# Patient Record
Sex: MALE | Race: WHITE
[De-identification: names, ages, dates, MRNs, and addresses within clinical notes are randomized per-mention and may not be internally consistent; named-entity substitution may affect disease eponyms.]

---

## 2017-03-03 ENCOUNTER — HOSPITAL ENCOUNTER (EMERGENCY)
Dept: HOSPITAL 58 - ED | Age: 32
Discharge: HOME | End: 2017-03-03

## 2017-03-03 VITALS — BODY MASS INDEX: 43 KG/M2

## 2017-03-03 VITALS — DIASTOLIC BLOOD PRESSURE: 89 MMHG | TEMPERATURE: 98.6 F | SYSTOLIC BLOOD PRESSURE: 148 MMHG

## 2017-03-03 DIAGNOSIS — M54.2: Primary | ICD-10-CM

## 2017-03-03 PROCEDURE — 96372 THER/PROPH/DIAG INJ SC/IM: CPT

## 2017-03-03 PROCEDURE — 99282 EMERGENCY DEPT VISIT SF MDM: CPT

## 2017-03-03 NOTE — CT
EXAM:  CT of the cervical spine without contrast 

  

History:  Neck pain. 

  

Technique:  Multiplanar CT images through the cervical spine were obtained without the administratio
n of IV contrast 

  

Findings:  The visualized lung apices are free of consolidation.  The visualized airway remains patel
nt. 

  

Reversal of the normal cervical lordosis.  No acute fracture or subluxation.  Disc space heights are
 preserved.  No prevertebral soft tissue swelling.  Predental space is not widened.  Bony spinal can
al is not compromised.  No significant bony neural foraminal narrowing. 

  

Impression:  No acute osseous abnormality of the cervical spine and no significant degenerative smith
ges.  Reversal of the normal cervical lordosis.

## 2017-03-03 NOTE — ED.PDOC
General


ED Provider: 


Dr. IRVIN PURI





Chief Complaint: Neck Pain Non-Injury


Stated Complaint: NECK PAIN


Time Seen by Physician: 13:00


Information Source: Patient


Primary Care Provider: 


BENTON MARCIALYAZ





Nursing and Triage Documentation Reviewed and Agree: Yes





Musculoskeletal Complaint Exam





- Neck Pain Complaint/Exam


Mechanism of Injury: Reports: No known trauma


Onset/Duration: 2 DAYS


Symptoms Are: Still present


Timing: Constant


Episodes Lasting: Hours


Initial Severity: Moderate


Current Severity: Moderate


Character: Reports: Aching, Throbbing, Spasmodic, Stiffness


Aggravating: Reports: Position, Movement


Alleviating: Reports: Position


Associated Signs and Symptoms: Denies: Swelling, Redness, Bruising, Fever, 

Nuchal rigidity, Weakness, Headache, Paresthesia


Related History: Reports: Similar episode


Meningitis Risk Factors: Reports: None


Cervical Spine Injury Risk Factors: Reports: Post-Midline CS tender


Related Surgical History: Reports: None


Carotid Bruit Present: No


Pain on Passive Flexion: No


Positive Kernig's Sign: No


Tenderness: Present: Paraspinal


Focal Weakness: Present: None


Focal Sensory Loss: Reports: None


Nexus Low Risk Criteria: No evidence of intoxicat., No Altered LOC, No focal 

neuro deficit, No distracting injuries


Differential Diagnoses: Arthritis, Sprain, Strain





Review of Systems





- Review Of Systems


Constitutional: Reports: No symptoms


Eyes: Reports: No symptoms


Ears, Nose, Mouth, Throat: Reports: No symptoms


Respiratory: Reports: No symptoms


Cardiac: Reports: No symptoms


GI: Reports: No symptoms


: Reports: No symptoms


Musculoskeletal: Reports: Neck pain


Skin: Reports: No symptoms


Neurological: Reports: No symptoms


Endocrine: Reports: No symptoms


Hematologic/Lymphatic: Reports: No symptoms


All Other Systems: Reviewed and Negative





Past Medical History





- Past Medical History


Previously Healthy: Yes


Endocrine: Reports: None


Cardiovascular: Reports: Hypertension


Respiratory: Reports: None


Hematological: Reports: None


Gastrointestinal: Reports: None


Genitourinary: Reports: None


Neuro/Psych: Reports: Depression, Bipolar Disorder


Musculoskeletal: Reports: None


Cancer: Reports: None





- Surgical History


General Surgical History: Reports: Orthopedic





- Family History


Family History: Reports: Unknown





- Social History


Smoking Status: Former smoker


Hx Substance Use: No


Alcohol Screening: Occasionally





Physical Exam





- Physical Exam


Appearance: Well-appearing, No pain distress, Well-nourished


Eyes: CESAR, EOMI, Conjunctiva clear


ENT: Ears normal, Nose normal, Oropharynx normal


Respiratory: Airway patent, Breath sounds clear, Breath sounds equal, 

Respirations nonlabored


Cardiovascular: RRR, Pulses normal, No rub, No murmur


GI/: Soft, Nontender, No masses, Bowel sounds normal, No Organomegaly


Musculoskeletal: Normal strength, ROM intact, No edema, No calf tenderness


Skin: Warm, Dry, Normal color


Neurological: Sensation intact, Motor intact, Reflexes intact, Cranial nerves 

intact, Alert, Oriented


Psychiatric: Affect appropriate, Mood appropriate





Interpretation





- Radiology Interpretation


Radiology Interpretation By: Radiologist





Critical Care Note





- Critical Care Note


Total Time (mins): 0





Course





- Course


Orders, Labs, Meds: 





Orders











 Category Date Time Status


 


 Morphine Sulfate [Morphine 4 mg/ml Syringe] MEDS  03/03/17 13:34 Stat





 4 mg IM ONCE STA   


 


 Ondansetron HCl/Pf [Zofran 4 mg/2 ml] MEDS  03/03/17 13:34 Stat





 4 mg IM ONCE STA   


 


 CT CERVICAL SPINE W/O CONTRAST Stat RADS  03/03/17 13:08 Taken








Medications














Discontinued Medications














Generic Name Dose Route Start Last Admin





  Trade Name Marissa  PRN Reason Stop Dose Admin


 


Morphine Sulfate  4 mg  03/03/17 13:34  





  Morphine 4 Mg/Ml Syringe  IM  03/03/17 13:35  





  ONCE STA   


 


Ondansetron HCl  4 mg  03/03/17 13:34  





  Zofran 4 Mg/2 Ml  IM  03/03/17 13:35  





  ONCE STA   











Vital Signs: 





 











  Temp Pulse Resp BP Pulse Ox


 


 03/03/17 12:51  98.6 F  71  20  148/89 H  95














Departure





- Departure


Time of Disposition: 14:30


Disposition: HOME SELF-CARE


Discharge Problem: 


 Neck pain





Instructions:  Cervical Sprain (ED), Acute Neck Pain (ED), Neck Pain (ED)


Condition: Good


Pt referred to PMD for follow-up: No


Additional Instructions: 


Please call your Family Physician as soon as possible to schedule a follow-up 

appointment.


Prescriptions: 


Oxycodone-Acetaminophen  [Percocet ] 1 tab PO Q8H #20 tablet


Allergies/Adverse Reactions: 


Allergies





No Known Allergies Allergy (Verified 03/03/17 13:01)


 








Home Medications: 


Ambulatory Orders





Oxycodone-Acetaminophen  [Percocet ] 1 tab PO Q8H #20 tablet 03/03/ 17 








Disposition Discussed With: Patient

## 2017-05-28 ENCOUNTER — HOSPITAL ENCOUNTER (EMERGENCY)
Dept: HOSPITAL 58 - ED | Age: 32
Discharge: HOME | End: 2017-05-28
Payer: COMMERCIAL

## 2017-05-28 VITALS — BODY MASS INDEX: 38.3 KG/M2

## 2017-05-28 VITALS — TEMPERATURE: 96.6 F | DIASTOLIC BLOOD PRESSURE: 95 MMHG | SYSTOLIC BLOOD PRESSURE: 149 MMHG

## 2017-05-28 DIAGNOSIS — S19.9XXA: Primary | ICD-10-CM

## 2017-05-28 DIAGNOSIS — M54.2: ICD-10-CM

## 2017-05-28 DIAGNOSIS — I10: ICD-10-CM

## 2017-05-28 PROCEDURE — 96372 THER/PROPH/DIAG INJ SC/IM: CPT

## 2017-05-28 PROCEDURE — 99283 EMERGENCY DEPT VISIT LOW MDM: CPT

## 2017-05-28 NOTE — ED.PDOC
General


ED Provider: 


Dr. IRVIN PURI





Chief Complaint: Neck Injury


Stated Complaint: neck pain


Time Seen by Physician: 16:43


Mode of Arrival: Walk-In


Information Source: Patient


Exam Limitations: No limitations


[facility] 


[facility]





Nursing and Triage Documentation Reviewed and Agree: Yes





Musculoskeletal Complaint Exam





- Neck Pain Complaint/Exam


Mechanism of Injury: Reports: No known trauma


Onset/Duration: 1 hr ago


Symptoms Are: Still present


Timing: Constant


Initial Severity: Moderate


Current Severity: Moderate


Location: Reports: Discrete


Character: Reports: Aching


Aggravating: Reports: None


Alleviating: Reports: None


Associated Signs and Symptoms: Denies: Swelling, Redness, Bruising, Fever, 

Nuchal rigidity, Weakness, Headache, Paresthesia


Related History: Reports: Similar episode


Meningitis Risk Factors: Reports: None


Cervical Spine Injury Risk Factors: Reports: None


Related Surgical History: Reports: None


Carotid Bruit Present: No


Pain on Passive Flexion: No


Positive Kernig's Sign: No


Tenderness: Present: Midline


Focal Weakness: Present: None


Focal Sensory Loss: Reports: None


Nexus Low Risk Criteria: No evidence of intoxicat., No Altered LOC, No focal 

neuro deficit, No distracting injuries


Differential Diagnoses: Cervical Fracture, Sprain, Strain





Review of Systems





- Review Of Systems


Constitutional: Reports: No symptoms


Eyes: Reports: No symptoms


Ears, Nose, Mouth, Throat: Reports: No symptoms


Respiratory: Reports: No symptoms


Cardiac: Reports: No symptoms


GI: Reports: No symptoms


: Reports: No symptoms


Musculoskeletal: Reports: Neck pain


Skin: Reports: No symptoms


Neurological: Reports: No symptoms


Endocrine: Reports: No symptoms


Hematologic/Lymphatic: Reports: No symptoms


All Other Systems: Reviewed and Negative





Past Medical History





- Past Medical History


Previously Healthy: Yes


Endocrine: Reports: None


Cardiovascular: Reports: Hypertension


Respiratory: Reports: None


Hematological: Reports: None


Gastrointestinal: Reports: None


Genitourinary: Reports: None


Neuro/Psych: Reports: Depression, Bipolar Disorder


Musculoskeletal: Reports: None


Cancer: Reports: None





- Surgical History


General Surgical History: Reports: Orthopedic





- Family History


Family History: Reports: Unknown





- Social History


Smoking Status: Former smoker


Hx Substance Use: No


Alcohol Screening: Occasionally





- Immunizations


Tetanus Shot up to Date: Yes





Physical Exam





- Physical Exam


Appearance: Well-appearing, No pain distress, Well-nourished


Eyes: CESAR, EOMI, Conjunctiva clear


ENT: Ears normal, Nose normal, Oropharynx normal


Respiratory: Airway patent, Breath sounds clear, Breath sounds equal, 

Respirations nonlabored


Cardiovascular: RRR, Pulses normal, No rub, No murmur


GI/: Soft, Nontender, No masses, Bowel sounds normal, No Organomegaly


Musculoskeletal: Normal strength, ROM intact, No edema, No calf tenderness


Skin: Warm, Dry, Normal color


Neurological: Sensation intact, Motor intact, Reflexes intact, Cranial nerves 

intact, Alert, Oriented


Psychiatric: Affect appropriate, Mood appropriate





Critical Care Note





- Critical Care Note


Total Time (mins): 0





Course





- Course


Orders, Labs, Meds: 





Orders











 Category Date Time Status


 


 Diazepam Syringe [Valium Syringe] MEDS  05/28/17 16:51 Discontinued





 5 mg IM ONCE STA   


 


 Ketorolac Tromethamine [Toradol] MEDS  05/28/17 16:52 Discontinued





 30 mg IM ONCE STA   


 


 Morphine Sulfate [Morphine 4 mg/ml Syringe] MEDS  05/28/17 16:51 Discontinued





 4 mg IM ONCE STA   


 


 Ondansetron HCl/Pf [Zofran 4 mg/2 ml] MEDS  05/28/17 16:51 Discontinued





 4 mg IM ONCE STA   








Medications














Discontinued Medications














Generic Name Dose Route Start Last Admin





  Trade Name Freq  PRN Reason Stop Dose Admin


 


Diazepam  5 mg  05/28/17 16:51  05/28/17 17:06





  Valium Syringe  IM  05/28/17 16:52  5 mg





  ONCE STA   Administration


 


Ketorolac Tromethamine  30 mg  05/28/17 16:52  05/28/17 17:05





  Toradol  IM  05/28/17 16:53  30 mg





  ONCE STA   Administration


 


Morphine Sulfate  4 mg  05/28/17 16:51  05/28/17 17:03





  Morphine 4 Mg/Ml Syringe  IM  05/28/17 16:52  4 mg





  ONCE STA   Administration


 


Ondansetron HCl  4 mg  05/28/17 16:51  05/28/17 17:02





  Zofran 4 Mg/2 Ml  IM  05/28/17 16:52  4 mg





  ONCE STA   Administration











Vital Signs: 





 











  Temp Pulse Resp BP Pulse Ox


 


 05/28/17 16:39  96.6 F L  81  20  149/95 H  95














Departure





- Departure


Time of Disposition: 17:40


Disposition: HOME SELF-CARE


Discharge Problem: 


 Neck pain, acute





Instructions:  Acute Neck Pain (ED), Neck Pain (ED), Cervical Sprain (ED), Soft 

Cervical Collar (ED), Cervical Strain (ED)


Condition: Good


Pt referred to PMD for follow-up: No


Additional Instructions: 


Please call your Family Physician as soon as possible to schedule a follow-up 

appointment.


Allergies/Adverse Reactions: 


Allergies





No Known Allergies Allergy (Verified 05/28/17 16:48)


 








Home Medications: 


Ambulatory Orders





1 [No Reported Medications]  05/28/17

## 2017-06-01 ENCOUNTER — HOSPITAL ENCOUNTER (OUTPATIENT)
Dept: HOSPITAL 58 - LAB | Age: 32
Discharge: HOME | End: 2017-06-01
Attending: PHYSICIAN ASSISTANT

## 2017-06-01 ENCOUNTER — HOSPITAL ENCOUNTER (EMERGENCY)
Dept: HOSPITAL 58 - ED | Age: 32
Discharge: HOME | End: 2017-06-01

## 2017-06-01 VITALS — BODY MASS INDEX: 38.3 KG/M2

## 2017-06-01 VITALS — DIASTOLIC BLOOD PRESSURE: 88 MMHG | TEMPERATURE: 96 F | SYSTOLIC BLOOD PRESSURE: 151 MMHG

## 2017-06-01 DIAGNOSIS — F17.220: ICD-10-CM

## 2017-06-01 DIAGNOSIS — S16.1XXA: Primary | ICD-10-CM

## 2017-06-01 DIAGNOSIS — M54.2: Primary | ICD-10-CM
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
## 2017-06-01 LAB
ALBUMIN SERPL-MCNC: 4 G/DL (ref 3.4–5)
ALBUMIN/GLOB SERPL: 1.18 {RATIO}
ALP SERPL-CCNC: 52 U/L (ref 50–136)
ALT SERPL-CCNC: 110 U/L (ref 12–78)
ANION GAP SERPL CALC-SCNC: 12.1 MMOL/L
AST SERPL-CCNC: 325 U/L (ref 15–37)
BILIRUB SERPL-MCNC: 0.34 MG/DL (ref 0–1.2)
BUN SERPL-MCNC: 12 MG/DL (ref 7–18)
BUN/CREAT SERPL: 13.18
CALCIUM SERPL-MCNC: 9.5 MG/DL (ref 8.2–10.2)
CHLORIDE SERPL-SCNC: 101 MMOL/L (ref 98–107)
CO2 BLD-SCNC: 29 MMOL/L (ref 21–32)
CREAT SERPL-MCNC: 0.91 MG/DL (ref 0.6–1.1)
GFR SERPLBLD BASED ON 1.73 SQ M-ARVRAT: 97 ML/MIN
GLOBULIN SER CALC-MCNC: 3.4 G/L
GLUCOSE SERPL-MCNC: 91 MG/DL (ref 70–100)
POTASSIUM SERPL-SCNC: 4.1 MMOL/L (ref 3.5–5.1)
PROT SERPL-MCNC: 7.4 G/DL (ref 6.4–8.2)
SODIUM SERPL-SCNC: 138 MMOL/L (ref 136–145)

## 2017-06-01 PROCEDURE — 36415 COLL VENOUS BLD VENIPUNCTURE: CPT

## 2017-06-01 PROCEDURE — 96372 THER/PROPH/DIAG INJ SC/IM: CPT

## 2017-06-01 PROCEDURE — 80053 COMPREHEN METABOLIC PANEL: CPT

## 2017-06-01 PROCEDURE — 96374 THER/PROPH/DIAG INJ IV PUSH: CPT

## 2017-06-01 PROCEDURE — 96375 TX/PRO/DX INJ NEW DRUG ADDON: CPT

## 2017-06-01 PROCEDURE — 99282 EMERGENCY DEPT VISIT SF MDM: CPT

## 2017-06-01 RX ADMIN — ONDANSETRON STA MG: 2 INJECTION INTRAMUSCULAR; INTRAVENOUS at 08:18

## 2017-06-01 RX ADMIN — KETOROLAC TROMETHAMINE STA MG: 30 INJECTION, SOLUTION INTRAMUSCULAR; INTRAVENOUS at 08:17

## 2017-06-01 RX ADMIN — MORPHINE SULFATE STA MG: 4 INJECTION, SOLUTION INTRAMUSCULAR; INTRAVENOUS at 08:18

## 2017-06-01 NOTE — ED.PDOC
General


ED Provider: 


Dr. IRVIN PURI





Chief Complaint: Neck Pain Non-Injury


Stated Complaint: neck pain


Time Seen by Physician: 07:43


Mode of Arrival: Walk-In


Information Source: Patient


Exam Limitations: No limitations


[facility] 


[facility]





Nursing and Triage Documentation Reviewed and Agree: Yes





Musculoskeletal Complaint Exam





- Neck Pain Complaint/Exam


Mechanism of Injury: Reports: Trauma, No known trauma (pulled  )


Symptoms Are: Still present


Timing: Intermittent


Episodes Lasting: Weeks


Initial Severity: Moderate


Current Severity: Moderate


Location: Reports: Discrete


Character: Reports: Dull, Aching


Aggravating: Reports: None


Alleviating: Reports: None


Associated Signs and Symptoms: Denies: Swelling, Redness, Bruising, Fever, 

Nuchal rigidity, Weakness, Headache, Paresthesia


Related History: Reports: Similar episode


Meningitis Risk Factors: Reports: None


Cervical Spine Injury Risk Factors: Reports: None


Related Surgical History: Reports: None


Carotid Bruit Present: No


Pain on Passive Flexion: No


Positive Kernig's Sign: No


Focal Weakness: Present: None


Focal Sensory Loss: Reports: None


Nexus Low Risk Criteria: No post-midline CS tender, No evidence of intoxicat., 

No Altered LOC, No focal neuro deficit, No distracting injuries


Differential Diagnoses: Sprain, Strain





Review of Systems





- Review Of Systems


Constitutional: Reports: No symptoms


Eyes: Reports: No symptoms


Ears, Nose, Mouth, Throat: Reports: No symptoms


Respiratory: Reports: No symptoms


Cardiac: Reports: No symptoms


GI: Reports: No symptoms


: Reports: No symptoms


Musculoskeletal: Reports: Back pain


Skin: Reports: No symptoms


Neurological: Reports: No symptoms


Endocrine: Reports: No symptoms


Hematologic/Lymphatic: Reports: No symptoms


All Other Systems: Reviewed and Negative





Past Medical History





- Past Medical History


Previously Healthy: Yes


Endocrine: Reports: None


Cardiovascular: Reports: Hypertension


Respiratory: Reports: None


Hematological: Reports: None


Gastrointestinal: Reports: None


Genitourinary: Reports: None


Neuro/Psych: Reports: Depression, Bipolar Disorder


Musculoskeletal: Reports: None


Cancer: Reports: None





- Surgical History


General Surgical History: Reports: Orthopedic





- Family History


Family History: Reports: Unknown





- Social History


Smoking Status: Chews tobacco


Hx Substance Use: No


Alcohol Screening: Occasionally





- Immunizations


Tetanus Shot up to Date: Yes





Physical Exam





- Physical Exam


Appearance: Well-appearing, No pain distress, Well-nourished


Eyes: CESAR, EOMI, Conjunctiva clear


ENT: Ears normal, Nose normal, Oropharynx normal


Respiratory: Airway patent, Breath sounds clear, Breath sounds equal, 

Respirations nonlabored


Cardiovascular: RRR, Pulses normal, No rub, No murmur


GI/: Soft, Nontender, No masses, Bowel sounds normal, No Organomegaly


Musculoskeletal: Normal strength, ROM intact, No edema, No calf tenderness


Skin: Warm, Dry, Normal color


Neurological: Sensation intact, Motor intact, Reflexes intact, Cranial nerves 

intact, Alert, Oriented


Psychiatric: Affect appropriate, Mood appropriate





Critical Care Note





- Critical Care Note


Total Time (mins): 0





Course





- Course


Orders, Labs, Meds: 





Orders











 Category Date Time Status


 


 Ketorolac Tromethamine [Toradol] MEDS  06/01/17 08:05 Discontinued





 30 mg IM ONCE STA   


 


 Morphine Sulfate [Morphine 4 mg/ml Syringe] MEDS  06/01/17 08:05 Discontinued





 2 mg IVP ONCE STA   


 


 Ondansetron HCl/Pf [Zofran 4 mg/2 ml] MEDS  06/01/17 08:05 Discontinued





 4 mg IVP ONCE STA   


 


 CT CERVICAL SPINE W/O CONTRAST Stat RADS  06/01/17 08:10 Completed








Medications














Discontinued Medications














Generic Name Dose Route Start Last Admin





  Trade Name Freq  PRN Reason Stop Dose Admin


 


Ketorolac Tromethamine  30 mg  06/01/17 08:05  06/01/17 08:17





  Toradol  IM  06/01/17 08:06  30 mg





  ONCE STA   Administration


 


Morphine Sulfate  2 mg  06/01/17 08:05  06/01/17 08:18





  Morphine 4 Mg/Ml Syringe  IVP  06/01/17 08:06  4 mg





  ONCE STA   Administration


 


Ondansetron HCl  4 mg  06/01/17 08:05  06/01/17 08:18





  Zofran 4 Mg/2 Ml  IVP  06/01/17 08:06  4 mg





  ONCE STA   Administration











Vital Signs: 





 











  Temp Pulse Resp BP Pulse Ox


 


 06/01/17 07:43  96.0 F L  66  16  151/88 H  96














Departure





- Departure


Time of Disposition: 09:48


Disposition: HOME SELF-CARE


Discharge Problem: 


 Neck pain, Neck muscle strain





Instructions:  Neck Pain (ED)


Condition: Good


Pt referred to PMD for follow-up: No


Allergies/Adverse Reactions: 


Allergies





No Known Allergies Allergy (Verified 05/28/17 16:48)


 








Home Medications: 


Ambulatory Orders





Oxycodone HCl/Acetaminophen [Percocet  mg Tablet] 10 mg PO TID PRN 06/01/ 17

## 2017-06-01 NOTE — CT
EXAM: CT cervical spine without contrast. 

  

HISTORY:   Neck pain and right arm numbness with injury 4 days prior 

  

COMPARISON: CT cervical spine 03/03/2017 

  

TECHNIQUE:  Serial axial images of the cervical spine were obtained from the skull base through the 
lung apices without contrast.  These were viewed in multiple planes. 

  

FINDINGS:  Vertebral bodies demonstrate no acute compression fracture or subluxation.  There is stra
ightening the cervical spine.  Disc space and vertebral body height are normal.  The odontoid proces
s is unremarkable.  The facets and posterior processes are unremarkable.  C1 ring is intact.  There 
are few scattered small disc bulges with no acute central or neural foraminal narrowing.  The soft t
issues are are unremarkable. 

  

IMPRESSION: 

1.  No acute abnormality, compression fracture or or subluxation. 

2.  Unchanged mild straightening of the cervical spine.

## 2017-06-02 ENCOUNTER — HOSPITAL ENCOUNTER (OUTPATIENT)
Dept: HOSPITAL 58 - RAD | Age: 32
Discharge: HOME | End: 2017-06-02
Attending: PHYSICIAN ASSISTANT

## 2017-06-02 VITALS — BODY MASS INDEX: 38.3 KG/M2

## 2017-06-02 DIAGNOSIS — M54.2: Primary | ICD-10-CM

## 2017-06-02 NOTE — MRI
EXAM:  Cervical spine MRI with and without contrast. 

  

HISTORY:  Neck pain. 

  

COMPARISON:  Cervical spine CT scan 06/01/2017. 

  

TECHNIQUE:  Multiplanar, multisequence MR images were acquired of the cervical spine before and afte
r administration of intravenous contrast. 

  

FINDINGS:  The craniocervical junction is unremarkable and the cervical cord has normal signal inten
sity.  After administration of contrast, there is no abnormal leptomeningeal enhancement.  There is 
straightening of the usual cervical lordosis. The cervical vertebra are normal in height and intrins
ic bone marrow signal.  Canal diameter is developmentally narrow.  There is mild adenoidal and palat
ine tonsillar hypertrophy which is considered normal for the patient's age.  There are no paraverteb
ral masses.  Visualized lung apices are clear. 

  

C2-3:  The intervertebral disc is normal. 

  

C3-4:  The intervertebral disc is normal. There is minor bilateral uncovertebral hypertrophy and mil
d left neural foraminal stenosis. 

  

C4-5:  There is a minor left posterior disc bulge with minor endplate osteophytes that merges with m
ild left uncovertebral hypertrophy.  There is mild left neural foraminal stenosis. 

  

C5-6:  There is mild disc bulge with small posterior endplate osteophytes and minor right and mild l
eft uncovertebral hypertrophy.  There is no central canal stenosis or foraminal stenosis. 

  

C6-7:  There is a mild disc bulge with a small central disc protrusion and minor endplate osteophyte
s that effaces the ventral thecal sac.  There is no central canal stenosis or foraminal stenosis.  A
P diameter of the thecal sac is 10.3 mm. 

  

C7-T1:  The intervertebral disc is normal. 

  

IMPRESSION: 

1.  Minor cervical degenerative spondylosis. 

2.  Mild discogenic disease C6-7 with small central disc protrusion.

## 2017-06-03 ENCOUNTER — HOSPITAL ENCOUNTER (EMERGENCY)
Facility: HOSPITAL | Age: 32
Discharge: HOME | End: 2017-06-03
Admitting: EMERGENCY MEDICINE

## 2017-06-03 VITALS
OXYGEN SATURATION: 97 % | RESPIRATION RATE: 18 BRPM | TEMPERATURE: 98 F | BODY MASS INDEX: 40.08 KG/M2 | DIASTOLIC BLOOD PRESSURE: 89 MMHG | HEART RATE: 75 BPM | WEIGHT: 255.38 LBS | SYSTOLIC BLOOD PRESSURE: 149 MMHG | HEIGHT: 67 IN

## 2017-06-03 DIAGNOSIS — M62.838 MUSCLE SPASMS OF NECK: Primary | ICD-10-CM

## 2017-06-03 DIAGNOSIS — M54.12 CERVICAL RADICULOPATHY: ICD-10-CM

## 2017-06-03 PROCEDURE — 96372 THER/PROPH/DIAG INJ SC/IM: CPT

## 2017-06-03 PROCEDURE — 99283 EMERGENCY DEPT VISIT LOW MDM: CPT

## 2017-06-03 PROCEDURE — 25010000002 DIAZEPAM PER 5 MG: Performed by: EMERGENCY MEDICINE

## 2017-06-03 PROCEDURE — 25010000002 HYDROMORPHONE PER 4 MG: Performed by: EMERGENCY MEDICINE

## 2017-06-03 RX ORDER — DIAZEPAM 5 MG/ML
5 INJECTION, SOLUTION INTRAMUSCULAR; INTRAVENOUS ONCE
Status: COMPLETED | OUTPATIENT
Start: 2017-06-03 | End: 2017-06-03

## 2017-06-03 RX ORDER — DIAZEPAM 5 MG/1
5 TABLET ORAL EVERY 6 HOURS PRN
Qty: 8 TABLET | Refills: 0 | Status: SHIPPED | OUTPATIENT
Start: 2017-06-03

## 2017-06-03 RX ORDER — METHYLPREDNISOLONE 4 MG/1
TABLET ORAL
Qty: 21 TABLET | Refills: 0 | Status: SHIPPED | OUTPATIENT
Start: 2017-06-03 | End: 2021-06-03

## 2017-06-03 RX ORDER — OXYCODONE AND ACETAMINOPHEN 10; 325 MG/1; MG/1
1 TABLET ORAL EVERY 8 HOURS PRN
COMMUNITY
End: 2021-06-03

## 2017-06-03 RX ADMIN — HYDROMORPHONE HYDROCHLORIDE 1 MG: 1 INJECTION, SOLUTION INTRAMUSCULAR; INTRAVENOUS; SUBCUTANEOUS at 11:35

## 2017-06-03 RX ADMIN — DIAZEPAM 5 MG: 5 INJECTION, SOLUTION INTRAMUSCULAR; INTRAVENOUS at 11:34

## 2017-06-03 NOTE — ED PROVIDER NOTES
"Subjective   HPI Comments: The patient is a 31-year-old male with chief complaint of right-sided neck pain extending to the shoulder.  The patient states that this began last week as he is doing  shoulder presses.  The patient complained of mild discomfort during the moment he was pressing over 150 pounds.  The patient states he completed the set, but the pain had escalated and worsened within 45 minutes.  The patient denies any other injury.  He states the pain is prominent on the right side of his neck into the right shoulder. He denies any further radiating pain. He is right-hand dominant.  He has worsening pain with moving his shoulder or turn his head over.  He went to [facility] on Sunday, which was one week ago.  He then revisited them again 2 days ago.  The patient states he had a CT and MRI completed.  He was prescribed Percocet and was advised to complete physical therapy.  The patient presents to this ED today \"wanting a second opinion and clearer answers.\"  The patient denies any further injury.  He denies any loss of sensation.    Patient is a 31 y.o. male presenting with neck pain.   History provided by:  Patient  History limited by:  Acuity of condition and age   used: No    Neck Pain   Pain location:  Generalized neck  Quality:  Shooting  Pain radiates to:  R shoulder and L shoulder  Pain severity:  Moderate  Pain is:  Same all the time  Onset quality:  Gradual  Timing:  Constant  Progression:  Unchanged  Chronicity:  New  Context: lifting a heavy object    Relieved by:  None tried  Worsened by:  Stress  Ineffective treatments:  None tried  Associated symptoms: no chest pain, no fever, no headaches, no numbness, no photophobia and no weakness      The patient is a 31-year-old male with chief complaint of right-sided neck pain extending to the shoulder.  The patient states that this began last week as he is doing  shoulder presses.  The patient complained of mild " "discomfort during the moment he was pressing over 150 pounds.  The patient states he completed the set but the pain had escalated and worsened within 45 minutes.  The patient denies any other injury.  He states the pain is prominent on the right side of his neck into the right shoulder. He denies any further radiating pain. He is right-hand dominant.  He has worsening pain with moving his shoulder or turn his head over.  He went to [facility] on Sunday which was one week ago.  He then revisited them again 2 days ago.  The patient states he had a CT and MRI completed.  He was prescribed Percocet and was advised to complete physical therapy.  The patient presents to this ED today \"wanting a second opinion and clearer answers.\"  The patient denies any further injury.  He denies any loss of sensation.      Review of Systems   Constitutional: Positive for activity change. Negative for appetite change, chills, diaphoresis, fatigue, fever and unexpected weight change.   HENT: Negative.  Negative for congestion, dental problem, drooling, ear discharge, ear pain, facial swelling, hearing loss, mouth sores, nosebleeds, postnasal drip, rhinorrhea, sinus pressure, sneezing, sore throat, tinnitus, trouble swallowing and voice change.    Eyes: Negative.  Negative for photophobia, pain, discharge, redness, itching and visual disturbance.   Respiratory: Negative.  Negative for apnea, cough, choking, chest tightness, shortness of breath, wheezing and stridor.    Cardiovascular: Negative.  Negative for chest pain, palpitations and leg swelling.   Gastrointestinal: Negative.  Negative for abdominal distention, abdominal pain, anal bleeding, blood in stool, constipation, diarrhea, nausea, rectal pain and vomiting.   Endocrine: Negative.  Negative for cold intolerance, polydipsia, polyphagia and polyuria.   Genitourinary: Negative.  Negative for decreased urine volume, difficulty urinating, discharge, dysuria, enuresis, flank pain, " frequency, genital sores, hematuria, penile pain, penile swelling, scrotal swelling, testicular pain and urgency.   Musculoskeletal: Positive for arthralgias, myalgias, neck pain and neck stiffness. Negative for back pain, gait problem and joint swelling.        Patient had pain sensation in his neck and and shoulder after heavy exercise regimen at gym.   Skin: Negative.  Negative for color change, pallor, rash and wound.   Allergic/Immunologic: Negative.  Negative for environmental allergies, food allergies and immunocompromised state.   Neurological: Negative.  Negative for dizziness, tremors, seizures, syncope, facial asymmetry, speech difficulty, weakness, light-headedness, numbness and headaches.   Hematological: Negative.  Negative for adenopathy. Does not bruise/bleed easily.   Psychiatric/Behavioral: Negative.  Negative for agitation, behavioral problems, confusion, decreased concentration, dysphoric mood, hallucinations, self-injury, sleep disturbance and suicidal ideas. The patient is not nervous/anxious and is not hyperactive.        Past Medical History:   Diagnosis Date   • TIA (transient ischemic attack) 2011       No Known Allergies    Past Surgical History:   Procedure Laterality Date   • KNEE CARTILAGE SURGERY         History reviewed. No pertinent family history.    Social History     Social History   • Marital status:      Spouse name: N/A   • Number of children: N/A   • Years of education: N/A     Social History Main Topics   • Smoking status: Never Smoker   • Smokeless tobacco: None   • Alcohol use Yes      Comment: OCC   • Drug use: No   • Sexual activity: Not Asked     Other Topics Concern   • None     Social History Narrative   • None       Prior to Admission medications    Medication Sig Start Date End Date Taking? Authorizing Provider   oxyCODONE-acetaminophen (PERCOCET)  MG per tablet Take 1 tablet by mouth Every 8 (Eight) Hours As Needed for Moderate Pain (4-6).   Yes  "Historical Provider, MD       Medications   HYDROmorphone (DILAUDID) injection 1 mg (1 mg Intramuscular Given 6/3/17 1135)   diazePAM (VALIUM) injection 5 mg (5 mg Intramuscular Given 6/3/17 1134)       /89 (BP Location: Right arm, Patient Position: Sitting)  Pulse 75  Temp 98 °F (36.7 °C) (Temporal Artery )   Resp 18  Ht 67\" (170.2 cm)  Wt 255 lb 6 oz (116 kg)  SpO2 97%  BMI 40 kg/m2      Objective   Physical Exam   Constitutional: He is oriented to person, place, and time. He appears well-developed and well-nourished.  Non-toxic appearance. No distress.   HENT:   Head: Normocephalic and atraumatic.   Right Ear: External ear normal.   Left Ear: External ear normal.   Nose: Nose normal.   Mouth/Throat: Uvula is midline, oropharynx is clear and moist and mucous membranes are normal. No oropharyngeal exudate.   Eyes: Conjunctivae, EOM and lids are normal. Pupils are equal, round, and reactive to light. Lids are everted and swept, no foreign bodies found. Right eye exhibits no discharge. Left eye exhibits no discharge. No scleral icterus.   Neck: Trachea normal, normal range of motion, full passive range of motion without pain and phonation normal. Neck supple. Normal carotid pulses and no JVD present. Carotid bruit is not present. No rigidity. No tracheal deviation present. No thyromegaly present.   Patient initially was a soft collar during my assessment.    Upon examination, he is nontender to palpate on his cervical spinous process.  He has moderate tenderness to palpation on his right paracervical region extending to the right trapezius muscle with some spasming noted.  He is tender to palpation on his right parascapular region as well with some muscle spasming.  He is nontender palpate on this left side.    The patient has pain reproducible when abducting his right shoulder from 0-90°.  He has full range of motion and sensation distally to his shoulder.  He has bounding pulses bilaterally. "   Cardiovascular: Normal rate, regular rhythm, normal heart sounds, intact distal pulses and normal pulses.  Exam reveals no gallop and no friction rub.    No murmur heard.  Pulmonary/Chest: Effort normal and breath sounds normal. No stridor. No apnea and no tachypnea. No respiratory distress. He has no wheezes. He has no rales. He exhibits no tenderness.   Abdominal: Soft. Normal appearance, normal aorta and bowel sounds are normal. He exhibits no distension and no mass. There is no hepatosplenomegaly. There is no tenderness. There is no guarding. No hernia.   Musculoskeletal: He exhibits tenderness. He exhibits no edema or deformity.   Patient had a pain sensation in his shoulder and neck region.       Vascular Status -  His exam exhibits right foot vasculature normal. His exam exhibits no right foot edema. His exam exhibits left foot vasculature normal. His exam exhibits no left foot edema.  Lymphadenopathy:     He has cervical adenopathy.   Neurological: He is alert and oriented to person, place, and time. He has normal strength and normal reflexes. He displays normal reflexes. No cranial nerve deficit or sensory deficit. He exhibits normal muscle tone. Coordination and gait normal. GCS eye subscore is 4. GCS verbal subscore is 5. GCS motor subscore is 6.   Skin: Skin is warm, dry and intact. No rash noted. He is not diaphoretic. No erythema. No pallor.   Psychiatric: He has a normal mood and affect. His speech is normal and behavior is normal. Judgment and thought content normal.   Nursing note and vitals reviewed.      Procedures         Lab Results (last 24 hours)     ** No results found for the last 24 hours. **          No results found.    ED Course  ED Course        The patient has been educated that he presents with muscle spasms and possible cervical radiculopathy.  I do not have access to [facility] and CT and MRI studies.  He does not present with a medical emergency.  He has been advised up with  orthopedic surgeon for further care.  He understands the emergency department cannot refill his pain medications nor refill any controlled substances for his ongoing issue.  He voices understanding and will follow-up with orthopedic surgeon.    MICHAEL REJAIME COMPLETED ([id number])    MDM  Number of Diagnoses or Management Options  Cervical radiculopathy: new and does not require workup  Muscle spasms of neck: new and does not require workup     Amount and/or Complexity of Data Reviewed  Independent visualization of images, tracings, or specimens: yes    Risk of Complications, Morbidity, and/or Mortality  Presenting problems: moderate  Diagnostic procedures: moderate  Management options: moderate    Critical Care  Total time providing critical care:  minutes    Patient Progress  Patient progress: stable      Final diagnoses:   Muscle spasms of neck   Cervical radiculopathy          KITA Alva  06/03/17 1140       Kylah NORWOOD MD  06/13/17 [id number]

## 2017-06-03 NOTE — ED NOTES
Pt states he is here for a second opinion due to he was working out Sunday and started aching pain to his  shoulder and neck. Pt was doing a  press when this occurred. Pt has been seen at [facility] on Sunday and Thursday and the want to send him for PT , but pt wants to make sure that is what he needs to do. Pt is wearing soft collar. Vascular checks intact to PENNY Perez RN  06/03/17 [id number]

## 2017-07-11 ENCOUNTER — HOSPITAL ENCOUNTER (OUTPATIENT)
Dept: HOSPITAL 58 - RAD | Age: 32
Discharge: HOME | End: 2017-07-11
Attending: ORTHOPAEDIC SURGERY

## 2017-07-11 VITALS — BODY MASS INDEX: 38.3 KG/M2

## 2017-07-11 DIAGNOSIS — M54.2: Primary | ICD-10-CM

## 2017-07-11 NOTE — DI
EXAM:  Seven views of the cervical spine. 

  

History:  Neck pain. 

  

Comparison:  MRI of the cervical spine 06/02/2017, CT cervical spine 06/01/2017 

  

Findings:  No acute fracture or subluxation.  Stable mild chronic loss of height at the superior end
plate of C4.  No prevertebral soft tissue swelling.  Disc space heights are preserved.  Predental sp
ace is not widened.  The oblique images demonstrate no significant bony neural foraminal narrowing. 
 No instability identified with flexion or extension. 

  

Impression: No acute or significant findings.

## 2017-08-29 ENCOUNTER — HOSPITAL ENCOUNTER (EMERGENCY)
Dept: HOSPITAL 58 - ED | Age: 32
LOS: 1 days | Discharge: HOME | End: 2017-08-30
Payer: COMMERCIAL

## 2017-08-29 VITALS — BODY MASS INDEX: 39.1 KG/M2

## 2017-08-29 VITALS — TEMPERATURE: 98.4 F | SYSTOLIC BLOOD PRESSURE: 166 MMHG | DIASTOLIC BLOOD PRESSURE: 101 MMHG

## 2017-08-29 DIAGNOSIS — R51: ICD-10-CM

## 2017-08-29 DIAGNOSIS — S09.93XA: ICD-10-CM
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
## 2017-08-29 DIAGNOSIS — I10: ICD-10-CM
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
## 2017-08-29 DIAGNOSIS — F17.220: ICD-10-CM

## 2017-08-29 DIAGNOSIS — S01.511A: Primary | ICD-10-CM

## 2017-08-29 PROCEDURE — 80053 COMPREHEN METABOLIC PANEL: CPT

## 2017-08-29 PROCEDURE — 80306 DRUG TEST PRSMV INSTRMNT: CPT

## 2017-08-29 PROCEDURE — 96372 THER/PROPH/DIAG INJ SC/IM: CPT

## 2017-08-29 PROCEDURE — 99283 EMERGENCY DEPT VISIT LOW MDM: CPT

## 2017-08-29 PROCEDURE — 85025 COMPLETE CBC W/AUTO DIFF WBC: CPT

## 2017-08-29 PROCEDURE — 80307 DRUG TEST PRSMV CHEM ANLYZR: CPT

## 2017-08-29 PROCEDURE — 36415 COLL VENOUS BLD VENIPUNCTURE: CPT
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
## 2017-08-30 LAB
ALBUMIN SERPL-MCNC: 4.5 G/DL (ref 3.4–5)
ALBUMIN/GLOB SERPL: 1.25 {RATIO}
ALP SERPL-CCNC: 63 U/L (ref 50–136)
ALT SERPL-CCNC: 21 U/L (ref 12–78)
ANION GAP SERPL CALC-SCNC: 13.6 MMOL/L
APAP SERPL-MCNC: < 3 UG/ML (ref 10–30)
AST SERPL-CCNC: 29 U/L (ref 15–37)
BASOPHILS # BLD AUTO: 0.1 K/UL (ref 0–0.2)
BASOPHILS NFR BLD AUTO: 0.5 % (ref 0–3)
BILIRUB SERPL-MCNC: 0.61 MG/DL (ref 0–1.2)
BUN SERPL-MCNC: 16 MG/DL (ref 7–18)
BUN/CREAT SERPL: 14.41
CALCIUM SERPL-MCNC: 9.7 MG/DL (ref 8.2–10.2)
CHLORIDE SERPL-SCNC: 103 MMOL/L (ref 98–107)
CO2 BLD-SCNC: 25 MMOL/L (ref 21–32)
COCAINE UR QL SCN: NEGATIVE
CREAT SERPL-MCNC: 1.11 MG/DL (ref 0.6–1.1)
EOSINOPHIL # BLD AUTO: 0.3 K/UL (ref 0–0.7)
EOSINOPHIL NFR BLD AUTO: 2.9 % (ref 0–7)
GFR SERPLBLD BASED ON 1.73 SQ M-ARVRAT: 76 ML/MIN
GLOBULIN SER CALC-MCNC: 3.6 G/L
GLUCOSE SERPL-MCNC: 92 MG/DL (ref 70–100)
HCT VFR BLD AUTO: 43.8 % (ref 42–52)
HGB BLD-MCNC: 15.6 G/DL (ref 14–18)
IMM GRANULOCYTES NFR BLD AUTO: 0.3 % (ref 0–5)
LYMPHOCYTES # SPEC AUTO: 3.1 K/UL (ref 0.6–3.4)
LYMPHOCYTES NFR BLD AUTO: 29.4 % (ref 10–50)
MCH RBC QN: 29.2 PG (ref 27–31)
MCHC RBC AUTO-ENTMCNC: 35.6 G/DL (ref 31.8–35.4)
MCV RBC: 81.9 FL (ref 80–94)
MONOCYTES # BLD AUTO: 0.8 K/UL (ref 0.4–2)
MONOCYTES NFR BLD AUTO: 7.1 % (ref 0–10)
NEUTROPHILS # BLD AUTO: 6.3 K/UL (ref 2–6.9)
NEUTROPHILS NFR BLD AUTO: 59.8 %
PLATELET # BLD AUTO: 241 10^3/UL (ref 140–440)
POTASSIUM SERPL-SCNC: 3.6 MMOL/L (ref 3.5–5.1)
PROT SERPL-MCNC: 8.1 G/DL (ref 6.4–8.2)
RBC # BLD AUTO: 5.35 10^6/UL (ref 4.7–6.1)
SALICYLATES SERPL-MCNC: < 5 MG/DL (ref 2.8–20)
SODIUM SERPL-SCNC: 138 MMOL/L (ref 136–145)
WBC # BLD AUTO: 10.51 K/UL (ref 4.2–10.2)

## 2017-08-30 NOTE — DI
EXAM:  Right wrist, three views, 08/29/2017 

  

HISTORY:  Injury 

  

COMPARISON:  None. 

  

FINDINGS / IMPRESSION:  The visualized osseous structures appear intact.  Anatomic alignment appears
 within normal limits. 

  

There is no evidence of fracture or dislocation. 

  

No acute osseous abnormality.

## 2017-08-30 NOTE — CT
EXAM:  CT maxillofacial without intravenous contrast 08/29/2017.  Sagittal and coronal reformatted i
mages obtained 

  

HISTORY:  Injury 

  

COMPARISON:  08/29/2017 

  

FINDINGS:  The facial bones appear intact without evidence of fracture.  Normal anatomic alignment i
s maintained. 

  

Normal aeration is maintained within the paranasal sinuses. 

  

No gross soft tissue abnormality. 

  

IMPRESSION:  No acute osseous abnormality of the facial bones.

## 2017-08-30 NOTE — CT
EXAM:  CT head without contrast 08/29/2017.  Sagittal and coronal reformatted images obtained 

  

HISTORY:  Injury 

  

COMPARISON:  09/02/2011 

  

FINDINGS: There is no evidence of intracranial hemorrhage.  The midline is maintained.  There is no 
hydrocephalus.    No cerebellar tonsillar ectopia.  Evaluation of the calvarium shows no fracture.  
The mastoid air cells are normally pneumatized. 

  

IMPRESSION: No acute intracranial abnormality.

## 2017-08-30 NOTE — DI
Exam:  Four x-rays of the right knee. 

  

Comparison:  05/24/2010. 

  

Reason for exam:  Injury with pain. 

  

FINDINGS:  No acute fracture or dislocation.  The joint space is relatively well maintained.  There 
is moderate degenerative disease with tricompartmental arthrosis and spurring of the tibial spines. 

  

Impression: 

  

No acute fracture or dislocation in the right knee with mild to moderate arthritic change.

## 2017-12-07 ENCOUNTER — OFFICE VISIT (OUTPATIENT)
Dept: PSYCHIATRY | Age: 32
End: 2017-12-07
Payer: COMMERCIAL

## 2017-12-07 VITALS
SYSTOLIC BLOOD PRESSURE: 138 MMHG | OXYGEN SATURATION: 97 % | BODY MASS INDEX: 39.71 KG/M2 | DIASTOLIC BLOOD PRESSURE: 78 MMHG | HEART RATE: 93 BPM | WEIGHT: 262 LBS | RESPIRATION RATE: 18 BRPM | HEIGHT: 68 IN

## 2017-12-07 DIAGNOSIS — F43.23 ADJUSTMENT DISORDER WITH MIXED ANXIETY AND DEPRESSED MOOD: Primary | ICD-10-CM

## 2017-12-07 PROCEDURE — 90791 PSYCH DIAGNOSTIC EVALUATION: CPT | Performed by: COUNSELOR

## 2017-12-07 PROCEDURE — 99999 PR OFFICE/OUTPT VISIT,PROCEDURE ONLY: CPT | Performed by: COUNSELOR

## 2017-12-07 RX ORDER — ALPRAZOLAM 0.5 MG/1
0.5 TABLET ORAL 2 TIMES DAILY
COMMUNITY
End: 2018-02-06 | Stop reason: ALTCHOICE

## 2017-12-07 RX ORDER — LISINOPRIL 20 MG/1
20 TABLET ORAL DAILY
COMMUNITY

## 2017-12-07 RX ORDER — BUPROPION HYDROCHLORIDE 150 MG/1
150 TABLET ORAL EVERY MORNING
COMMUNITY
End: 2018-01-04 | Stop reason: SDUPTHER
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
## 2017-12-20 ENCOUNTER — OFFICE VISIT (OUTPATIENT)
Dept: PSYCHIATRY | Age: 32
End: 2017-12-20
Payer: COMMERCIAL

## 2017-12-20 DIAGNOSIS — F43.23 ADJUSTMENT DISORDER WITH MIXED ANXIETY AND DEPRESSED MOOD: Primary | ICD-10-CM

## 2017-12-20 PROCEDURE — 99999 PR OFFICE/OUTPT VISIT,PROCEDURE ONLY: CPT | Performed by: COUNSELOR

## 2017-12-20 PROCEDURE — 90834 PSYTX W PT 45 MINUTES: CPT | Performed by: COUNSELOR

## 2017-12-20 NOTE — PROGRESS NOTES
Therapy Progress Note  [facility] OF KRISTIE JAMISON  12/20/2017  8:21 AM      Time spent with Patient: 30 minutes  This is patient's second  Therapy appointment. Reason for Consult:  depression, stress and agitation  Referring Provider: No referring provider defined for this encounter. Marcus Goel ,a 34 y.o. male, for initial evaluation visit. Pt provided informed consent for the behavioral health program. Discussed with patient model of service to include the limits of confidentiality (i.e. abuse reporting, suicide intervention, etc.) and short-term intervention focused approach. Discussed no show and late cancellation policy. Pt indicated understanding. S:  Therapist and pt discuss behavioral activation- Pt is going back to the gym, trying to change up his routine. Doing more cardio \"which seems to be helping\". Once he is there he is fine but having a hard time getting motivated in any area of his life. Playing video games to distract him from his thoughts. Feels he has some coping skills but they are all distraction techniques. Therapist educates pt on Mindfulness techniques and how to challenge anxious or negative thoughts. Pt accepts handouts on each to practice at home. Pt feels he was letting his anxiety take control of him. 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 However, he feels this may be a rash decision. Therapist encouraged him to give himself more time to weigh the pros and cons if he is not certain about this decision. Pt states he use to have nightmares or \"obscure dreams\" 4-5 times per week, waking him up multiple times. Now pt believes he has only had a couple over the past month. PT denies SI, HI and AVH at this time.      MSE:    Appearance    alert, cooperative  Appetite normal  Sleep disturbance No  Fatigue Yes  Loss of pleasure No  Impulsive behavior No  Speech    normal rate and normal volume  Mood    Anxious  Depressed  Affect anxiety  Thought Content    intrusive thoughts and cognitive distortions  Thought Process    goal directed  Associations    logical connections  Insight    Good  Judgment    Intact  Orientation    oriented to person, place, time, and general circumstances  Memory    recent and remote memory intact  Attention/Concentration    intact  Morbid ideation No  Suicide Assessment    no suicidal ideation      History:    Medications:   Current Outpatient Prescriptions   Medication Sig Dispense Refill    ALPRAZolam (XANAX) 0.5 MG tablet Take 0.5 mg by mouth 2 times daily .  buPROPion (WELLBUTRIN XL) 150 MG extended release tablet Take 150 mg by mouth every morning      metoprolol tartrate (LOPRESSOR) 25 MG tablet Take 25 mg by mouth 2 times daily      lisinopril (PRINIVIL;ZESTRIL) 20 MG tablet Take 20 mg by mouth daily      Suvorexant (BELSOMRA) 10 MG TABS Take 10 mg by mouth nightly as needed  . No current facility-administered medications for this visit. Social History:   Social History     Social History    Marital status:      Spouse name: N/A    Number of children: N/A    Years of education: N/A     Occupational History    Not on file. Social History Main Topics    Smoking status: Former Smoker    Smokeless tobacco: Current User     Types: Chew    Alcohol use Yes    Drug use: No    Sexual activity: Not on file     Other Topics Concern    Not on file     Social History Narrative    No narrative on file       TOBACCO:   reports that he has quit smoking. His smokeless tobacco use includes Chew. ETOH:   reports that he drinks alcohol. Family History:   Family History   Problem Relation Age of Onset    Family history unknown: Yes       Diagnosis:    Adjustment D/O      Diagnosis Date    Anxiety     Depression     Hypertension      Occupational problems and Other psychosocial and environmental problems    Plan:  1. Scheduled to see Luciano Hansen, 1/4/17  2.  Discuss challenging anxious/negative thoughts  3.  Discuss Mindfulness exercises    Pt interventions:  Provided handout on  anxiety, Trained in strategies for increasing balanced thinking, Provided education, Discussed self-care (sleep, nutrition, rewarding activities, social support, exercise), Motivational Interviewing to determine importance and readiness for change, Discussed use of imagery, distractions, relaxation, mood management, communication training, questioning unhelpful thinking, problem-solving, and behavioral activation to manage pain, Established rapport, Supportive techniques, Emphasized self-care as important for managing overall health and CBT to target depression and anxiety      Beto De Paz

## 2018-01-04 ENCOUNTER — TELEPHONE (OUTPATIENT)
Dept: PSYCHIATRY | Age: 33
End: 2018-01-04

## 2018-01-04 ENCOUNTER — OFFICE VISIT (OUTPATIENT)
Dept: PSYCHIATRY | Age: 33
End: 2018-01-04
Payer: COMMERCIAL

## 2018-01-04 VITALS
HEIGHT: 67 IN | DIASTOLIC BLOOD PRESSURE: 88 MMHG | BODY MASS INDEX: 40.81 KG/M2 | OXYGEN SATURATION: 98 % | SYSTOLIC BLOOD PRESSURE: 142 MMHG | WEIGHT: 260 LBS | HEART RATE: 101 BPM

## 2018-01-04 DIAGNOSIS — F41.9 ANXIETY: ICD-10-CM

## 2018-01-04 DIAGNOSIS — F32.9 REACTIVE DEPRESSION: Primary | ICD-10-CM

## 2018-01-04 PROCEDURE — 99215 OFFICE O/P EST HI 40 MIN: CPT | Performed by: NURSE PRACTITIONER

## 2018-01-04 RX ORDER — CLONAZEPAM 0.5 MG/1
0.5 TABLET ORAL 3 TIMES DAILY PRN
Qty: 90 TABLET | Refills: 0 | Status: SHIPPED | OUTPATIENT
Start: 2018-01-04 | End: 2018-02-06 | Stop reason: SDUPTHER

## 2018-01-04 RX ORDER — BUPROPION HYDROCHLORIDE 150 MG/1
150 TABLET ORAL 2 TIMES DAILY
Qty: 60 TABLET | Refills: 2 | Status: SHIPPED | OUTPATIENT
Start: 2018-01-04 | End: 2018-02-06 | Stop reason: SDUPTHER
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
## 2018-01-24 ENCOUNTER — OFFICE VISIT (OUTPATIENT)
Dept: PSYCHIATRY | Age: 33
End: 2018-01-24
Payer: COMMERCIAL

## 2018-01-24 VITALS
HEIGHT: 67 IN | BODY MASS INDEX: 40.49 KG/M2 | OXYGEN SATURATION: 98 % | SYSTOLIC BLOOD PRESSURE: 129 MMHG | DIASTOLIC BLOOD PRESSURE: 77 MMHG | WEIGHT: 258 LBS | HEART RATE: 63 BPM

## 2018-01-24 DIAGNOSIS — F43.23 ADJUSTMENT DISORDER WITH MIXED ANXIETY AND DEPRESSED MOOD: Primary | ICD-10-CM

## 2018-01-24 PROCEDURE — 99999 PR OFFICE/OUTPT VISIT,PROCEDURE ONLY: CPT | Performed by: COUNSELOR

## 2018-01-24 PROCEDURE — 90832 PSYTX W PT 30 MINUTES: CPT | Performed by: COUNSELOR

## 2018-01-24 NOTE — PROGRESS NOTES
Diagnosis Date    Anxiety     Depression     Hypertension      Occupational problems and Other psychosocial and environmental problems    Plan:  1. Continue medication management  2. CBT to target depression and anxiety  3.  Discuss Daily Mood Chart  Pt interventions:  Trained in strategies for increasing balanced thinking, Provided education, Discussed self-care (sleep, nutrition, rewarding activities, social support, exercise), Discussed use of imagery, distractions, relaxation, mood management, communication training, questioning unhelpful thinking, problem-solving, and behavioral activation to manage pain, Supportive techniques, Emphasized self-care as important for managing overall health and CBT to target depression and anxiety      [facility]

## 2018-02-06 ENCOUNTER — TELEPHONE (OUTPATIENT)
Dept: PSYCHIATRY | Age: 33
End: 2018-02-06

## 2018-02-06 ENCOUNTER — OFFICE VISIT (OUTPATIENT)
Dept: PSYCHIATRY | Age: 33
End: 2018-02-06
Payer: COMMERCIAL

## 2018-02-06 VITALS
HEART RATE: 63 BPM | HEIGHT: 67 IN | DIASTOLIC BLOOD PRESSURE: 65 MMHG | OXYGEN SATURATION: 98 % | SYSTOLIC BLOOD PRESSURE: 109 MMHG | BODY MASS INDEX: 40.49 KG/M2 | WEIGHT: 258 LBS | RESPIRATION RATE: 18 BRPM

## 2018-02-06 DIAGNOSIS — F43.23 ADJUSTMENT DISORDER WITH MIXED ANXIETY AND DEPRESSED MOOD: Primary | ICD-10-CM

## 2018-02-06 DIAGNOSIS — F32.9 REACTIVE DEPRESSION: ICD-10-CM

## 2018-02-06 PROCEDURE — 99214 OFFICE O/P EST MOD 30 MIN: CPT | Performed by: NURSE PRACTITIONER

## 2018-02-06 RX ORDER — BUPROPION HYDROCHLORIDE 450 MG/1
TABLET, FILM COATED, EXTENDED RELEASE ORAL
Qty: 30 TABLET | Refills: 1 | Status: SHIPPED | OUTPATIENT
Start: 2018-02-06 | End: 2018-03-07 | Stop reason: SDUPTHER

## 2018-02-06 RX ORDER — CLONAZEPAM 0.5 MG/1
0.5 TABLET ORAL 3 TIMES DAILY PRN
Qty: 90 TABLET | Refills: 0 | Status: SHIPPED | OUTPATIENT
Start: 2018-02-06 | End: 2018-03-07 | Stop reason: SDUPTHER

## 2018-02-06 NOTE — TELEPHONE ENCOUNTER
Called pt Klonopin into [facility] per MANGO Márquez NP/ Dr. Talia Mcarthur. Spoke with Ivonne at pharmacy.

## 2018-02-06 NOTE — PROGRESS NOTES
orders/labs as follows: No orders of the defined types were placed in this encounter. No orders of the defined types were placed in this encounter.       9. Additional comments:      Shalini Franco, TRESSA-BC

## 2018-02-15 ENCOUNTER — OFFICE VISIT (OUTPATIENT)
Dept: PSYCHIATRY | Age: 33
End: 2018-02-15
Payer: COMMERCIAL

## 2018-02-15 VITALS
SYSTOLIC BLOOD PRESSURE: 138 MMHG | HEIGHT: 67 IN | BODY MASS INDEX: 39.98 KG/M2 | WEIGHT: 254.7 LBS | DIASTOLIC BLOOD PRESSURE: 73 MMHG | OXYGEN SATURATION: 97 % | HEART RATE: 58 BPM

## 2018-02-15 DIAGNOSIS — F43.23 ADJUSTMENT DISORDER WITH MIXED ANXIETY AND DEPRESSED MOOD: Primary | ICD-10-CM

## 2018-02-15 PROCEDURE — 90832 PSYTX W PT 30 MINUTES: CPT | Performed by: COUNSELOR

## 2018-02-15 PROCEDURE — 99999 PR OFFICE/OUTPT VISIT,PROCEDURE ONLY: CPT | Performed by: COUNSELOR

## 2018-02-15 NOTE — PROGRESS NOTES
Therapy Progress Note  [facility]  2/15/2018  1:27 PM      Time spent with Patient: 30 minutes  This is patient's fourth  Therapy appointment. Reason for Consult:  depression, anxiety and stress  Referring Provider: No referring provider defined for this encounter. Jono Silva ,a 34 y.o. male, for initial evaluation visit. Pt provided informed consent for the behavioral health program. Discussed with patient model of service to include the limits of confidentiality (i.e. abuse reporting, suicide intervention, etc.) and short-term intervention focused approach. Discussed no show and late cancellation policy. Pt indicated understanding. S:  Pt presents today with blunted affect. Reports his dreams have decreased. However, he feels with the new increase in medications it has slowed him down some. His speech is slightly slowed today. Pt states he tore his meniscus and can't run or workout anymore. This hurts his self-esteem but he has been setting goals in other areas of his life which helps him get motivated. He is currently tearing up the old floors in his house and is going to put new wood down. He is having marital issues- \"we're always at odds about everything. \" Feels like she doesn't help around the house even though she is a stay-at-home mom. He tries to motivate her but he believes she may have depression as well. Pt reports he hadn't been drinking hardly at all until yesterday when he got drunk. Regretted it. Sometimes wonders if he has a problem- can go long periods of time without it but binge drinks most of the time when he does decide to have a drink. Therapist gives pt a worksheet to complete on therapeutic goals. Will discuss at next appointment. Pt denies SI, HI and AVH at this time.     MSE:    Appearance    alert, cooperative  Appetite normal  Sleep disturbance getting 5-6 hours  Fatigue Yes  Loss of pleasure No  Impulsive behavior No  Speech    normal volume, speech

## 2018-03-02 ENCOUNTER — OFFICE VISIT (OUTPATIENT)
Dept: PSYCHIATRY | Age: 33
End: 2018-03-02
Payer: COMMERCIAL

## 2018-03-02 VITALS
HEART RATE: 62 BPM | WEIGHT: 245 LBS | SYSTOLIC BLOOD PRESSURE: 150 MMHG | BODY MASS INDEX: 37.13 KG/M2 | HEIGHT: 68 IN | DIASTOLIC BLOOD PRESSURE: 92 MMHG | OXYGEN SATURATION: 98 %

## 2018-03-02 DIAGNOSIS — F43.23 ADJUSTMENT DISORDER WITH MIXED ANXIETY AND DEPRESSED MOOD: Primary | ICD-10-CM

## 2018-03-02 PROCEDURE — 90832 PSYTX W PT 30 MINUTES: CPT | Performed by: COUNSELOR

## 2018-03-02 PROCEDURE — 99999 PR OFFICE/OUTPT VISIT,PROCEDURE ONLY: CPT | Performed by: COUNSELOR

## 2018-03-02 NOTE — PROGRESS NOTES
Therapy Progress Note  [facility]  3/2/2018  1:31 PM      Time spent with Patient: 30 minutes  This is patient's fifth  Therapy appointment. Reason for Consult:  depression, anxiety and stress  Referring Provider: No referring provider defined for this encounter. Ying May ,a 34 y.o. male, for initial evaluation visit. Pt provided informed consent for the behavioral health program. Discussed with patient model of service to include the limits of confidentiality (i.e. abuse reporting, suicide intervention, etc.) and short-term intervention focused approach. Discussed no show and late cancellation policy. Pt indicated understanding. 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 Pt states he has had increased stress and anxiety since this change at work. Pt states he had a panic attack at [facility]- almost had the checkout lady call an ambulance because he felt he couldn't breathe and kept fading in and out and thought he was going to pass out. Pt states he is using distraction techniques as coping skills and still turning to alcohol to help with his anxiety. Therapist encourages pt to think of a list of positive coping skills he could use in place of drinking. Pt states he is, \"Never happy\" and \"it's all getting to be a lot. \"   Has only had one day a week off from work lately. Feels like he is working, sleeping rinse and repeat. No time for relaxation, family time etc. Pt denies SI, HI and AVH at this time. Therapist gives pt handouts on DBT mindfulness skills as well as positive steps to wellbeing. MSE:    Appearance    alert, cooperative  Appetite normal  Sleep disturbance yes- waking up more frequently in the night and has had a few bothersome dreams again.    Fatigue Yes  Loss of pleasure No  Impulsive behavior No  Speech    normal rate and normal volume  Mood    Anxious  Depressed  Irritability  Panic attacks  Affect environmental problems    Plan:  1. Continue medication management  2. DBT mindfulness skills  3.  Discuss positive coping skills    Pt interventions:  Provided handout on  anxiety and stress, Trained in relaxation strategies, Provided education, Discussed self-care (sleep, nutrition, rewarding activities, social support, exercise), Discussed potential barriers to change, Discussed use of imagery, distractions, relaxation, mood management, communication training, questioning unhelpful thinking, problem-solving, and behavioral activation to manage pain, Supportive techniques, Emphasized self-care as important for managing overall health and CBT to target depression and anxiety      [facility]

## 2018-03-07 ENCOUNTER — TELEPHONE (OUTPATIENT)
Dept: PSYCHIATRY | Age: 33
End: 2018-03-07

## 2018-03-07 ENCOUNTER — OFFICE VISIT (OUTPATIENT)
Dept: PSYCHIATRY | Age: 33
End: 2018-03-07
Payer: COMMERCIAL

## 2018-03-07 VITALS
WEIGHT: 247 LBS | HEART RATE: 59 BPM | SYSTOLIC BLOOD PRESSURE: 125 MMHG | HEIGHT: 68 IN | DIASTOLIC BLOOD PRESSURE: 61 MMHG | BODY MASS INDEX: 37.44 KG/M2 | OXYGEN SATURATION: 99 %

## 2018-03-07 DIAGNOSIS — F43.23 ADJUSTMENT DISORDER WITH MIXED ANXIETY AND DEPRESSED MOOD: Primary | ICD-10-CM

## 2018-03-07 PROCEDURE — 99214 OFFICE O/P EST MOD 30 MIN: CPT | Performed by: NURSE PRACTITIONER

## 2018-03-07 RX ORDER — CLONAZEPAM 0.5 MG/1
0.5 TABLET ORAL 3 TIMES DAILY PRN
Qty: 90 TABLET | Refills: 0 | Status: SHIPPED | OUTPATIENT
Start: 2018-03-07 | End: 2018-04-10 | Stop reason: SDUPTHER

## 2018-03-07 RX ORDER — PRAZOSIN HYDROCHLORIDE 1 MG/1
1 CAPSULE ORAL NIGHTLY
Qty: 30 CAPSULE | Refills: 3 | Status: SHIPPED | OUTPATIENT
Start: 2018-03-07 | End: 2018-06-06 | Stop reason: CLARIF

## 2018-03-07 RX ORDER — LAMOTRIGINE 25 MG/1
25 TABLET ORAL DAILY
Qty: 30 TABLET | Refills: 3 | Status: SHIPPED | OUTPATIENT
Start: 2018-03-07 | End: 2018-03-30 | Stop reason: DRUGHIGH

## 2018-03-07 RX ORDER — BUPROPION HYDROCHLORIDE 450 MG/1
TABLET, FILM COATED, EXTENDED RELEASE ORAL
Qty: 30 TABLET | Refills: 2 | Status: SHIPPED | OUTPATIENT
Start: 2018-03-07 | End: 2018-06-06 | Stop reason: SDUPTHER

## 2018-03-07 NOTE — PROGRESS NOTES
chart review and patient  PCP is Anton Zavala      Current Meds:    Prior to Admission medications    Medication Sig Start Date End Date Taking? Authorizing Provider   buPROPion 450 MG TB24 Take 1 capsule every morning 2/6/18  Yes STACIA Lund   clonazePAM (KLONOPIN) 0.5 MG tablet Take 1 tablet by mouth 3 times daily as needed for Anxiety for up to 30 days. 2/6/18 3/8/18 Yes STACIA Lund   metoprolol tartrate (LOPRESSOR) 25 MG tablet Take 25 mg by mouth 2 times daily   Yes Historical Provider, MD   lisinopril (PRINIVIL;ZESTRIL) 20 MG tablet Take 20 mg by mouth daily   Yes Historical Provider, MD   Suvorexant (BELSOMRA) 10 MG TABS Take 10 mg by mouth nightly as needed  . Yes Historical Provider, MD         MSE:  Patient is  A & O x3. Appearance:  ill-appearing, street clothes and in chair appropriately dressed for season and age. Cognition:  Recent memory intact , remote memory intact , good fund of knowledge, average  intelligence level. Speech:  monotone  Language: Naming: intact; Word Finding: intact  Conversation no evidence of delusions  Behavior:  Cooperative  Mood: depressed and anxious  Affect: congruent with mood  Thought Content: no evidence of overt psychosis, delusional thought or suicidal /homicidal ideation or plan  Thought Process: goal directed and coherent  Judgement Insight:  normal and appropriate  Gait and Station:normal gait and station and normal balance   Musculoskeletal: WNL      Assesment:   1. Adjustment disorder with mixed anxiety and depressed mood    Patient reports new itching on his legs. Plan:  Continue medications as prescribed. Start Prazosin 1 mg PO qhs  Start Lamictal 25 mg PO qhs  1. The risks, benefits, side effects, indications, contraindications, and adverse effects of the medications have been discussed. Yes.  2. The pt has verbalized understanding and has capacity to give informed consent.   3. The Cristiane Weeks report has been reviewed

## 2018-03-13 ENCOUNTER — HOSPITAL ENCOUNTER (OUTPATIENT)
Dept: HOSPITAL 58 - LAB | Age: 33
Discharge: HOME | End: 2018-03-13
Attending: INTERNAL MEDICINE

## 2018-03-13 VITALS — BODY MASS INDEX: 39.1 KG/M2

## 2018-03-13 DIAGNOSIS — S81.001A: Primary | ICD-10-CM

## 2018-03-13 PROCEDURE — 80053 COMPREHEN METABOLIC PANEL: CPT

## 2018-03-13 PROCEDURE — 36415 COLL VENOUS BLD VENIPUNCTURE: CPT

## 2018-03-13 PROCEDURE — 85025 COMPLETE CBC W/AUTO DIFF WBC: CPT

## 2018-03-29 ENCOUNTER — OFFICE VISIT (OUTPATIENT)
Dept: PSYCHIATRY | Age: 33
End: 2018-03-29
Payer: COMMERCIAL

## 2018-03-29 DIAGNOSIS — F43.23 ADJUSTMENT DISORDER WITH MIXED ANXIETY AND DEPRESSED MOOD: Primary | ICD-10-CM

## 2018-03-29 PROCEDURE — 90834 PSYTX W PT 45 MINUTES: CPT | Performed by: COUNSELOR

## 2018-03-29 PROCEDURE — 99999 PR OFFICE/OUTPT VISIT,PROCEDURE ONLY: CPT | Performed by: COUNSELOR

## 2018-03-29 NOTE — PROGRESS NOTES
Therapy Progress Note  [facility]  3/29/2018  1:30 PM      Time spent with Patient: 45 minutes  This is patient's sixth  Therapy appointment. Reason for Consult:  depression, anxiety and stress  Referring Provider: No referring provider defined for this encounter. Ben Hodges ,a 34 y.o. male, for initial evaluation visit. Pt provided informed consent for the behavioral health program. Discussed with patient model of service to include the limits of confidentiality (i.e. abuse reporting, suicide intervention, etc.) and short-term intervention focused approach. Discussed no show and late cancellation policy. Pt indicated understanding. 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 The officers had a debriefing on it this week. One of the other deputies in the debriefing stated something that \"hit home\" with pt. He states they deputy was talking about how he's always been able to handle the job until this. This one finally cracked him. Pt states he thinks that is what happened to him when he started coming to the clinic a few months ago. Pt is struggling with irritability due to back pain for the past 1-2 months. States his house is a \"disaster\" due to the remodel he's been doing. This causes him a lot of anxiety. He had another panic attack recently- eyes flashed black and white. Had his wife take over driving. He used deep breathing technique to help decrease his anxiety. Willing to consider increasing his anxiety medication. States he told his NP he has been having suicidal thoughts. Has fleeting thoughts of how he could do it (using his gun etc) \"I don't have any intent. I just want things to get better. \" Impulsive behavior lately- had 3.5 hours left on a shift and went drinking with a rosalie.  States he's never done anything like that but \"doesn't care at this point if he gets fired\". Doesn't intend to drink on the job again though. Pt has had no appetite for the past 3 days. Went 2 days without eating. Got drunk last night. Hadn't drank in 2 weeks prior to that. Therapist recommended he be evaluated in ER due to his SI and acute impulsive behavior. Pt refuses, stating he would lose his badge and gun and he can't afford that right now. Pt tells therapist he does not have intent or plan to harm/kill himself at this time and will come to ER to be evaluated if it worsens. Therapist scheduled pt to see NP tomorrow at 3:00. He will also have a phone session next week with this therapist as he was not available to come in next week. Pt states his protective factors are: his children, wife, still trying to motivate himself to get out and do things, going to [employer] etc. Pt denies SI, HI and AVH at this time. MSE:    Appearance    alert, cooperative, moderate distress  Appetite abnormal: see above  Sleep disturbance Yes  Fatigue Yes  Loss of pleasure No  Impulsive behavior Yes  Speech    normal rate and normal volume  Mood    Anxious  Depressed  Irritability  Affect    depressed affect  Thought Content    intact  Thought Process    goal directed \"I just want things to get better. \"  Associations    logical connections  Insight    Good  Judgment    Intact at this time  Orientation    oriented to person, place, time, and general circumstances  Memory    recent and remote memory intact  Attention/Concentration    intact  Morbid ideation No  Suicide Assessment    suicidal ideation with no plan or intent    History:    Medications:   Current Outpatient Prescriptions   Medication Sig Dispense Refill    prazosin (MINIPRESS) 1 MG capsule Take 1 capsule by mouth nightly 30 capsule 3    clonazePAM (KLONOPIN) 0.5 MG tablet Take 1 tablet by mouth 3 times daily as needed for Anxiety for up to 30 days.  90 tablet 0    BuPROPion HCl ER, XL, 450 MG TB24 Take 1 capsule every morning 30 tablet 2

## 2018-03-30 ENCOUNTER — OFFICE VISIT (OUTPATIENT)
Dept: PSYCHIATRY | Age: 33
End: 2018-03-30
Payer: COMMERCIAL

## 2018-03-30 DIAGNOSIS — F43.23 ADJUSTMENT DISORDER WITH MIXED ANXIETY AND DEPRESSED MOOD: Primary | ICD-10-CM

## 2018-03-30 DIAGNOSIS — F32.9 REACTIVE DEPRESSION: ICD-10-CM

## 2018-03-30 DIAGNOSIS — F41.9 ANXIETY: ICD-10-CM

## 2018-03-30 PROCEDURE — 99214 OFFICE O/P EST MOD 30 MIN: CPT | Performed by: NURSE PRACTITIONER

## 2018-03-30 RX ORDER — LAMOTRIGINE 100 MG/1
100 TABLET ORAL DAILY
Qty: 30 TABLET | Refills: 3 | Status: SHIPPED | OUTPATIENT
Start: 2018-03-30 | End: 2018-08-07 | Stop reason: SDUPTHER

## 2018-04-03 ENCOUNTER — HOSPITAL ENCOUNTER (OUTPATIENT)
Dept: HOSPITAL 58 - RAD | Age: 33
Discharge: HOME | End: 2018-04-03
Attending: INTERNAL MEDICINE
Payer: COMMERCIAL

## 2018-04-03 VITALS — BODY MASS INDEX: 39.1 KG/M2

## 2018-04-03 DIAGNOSIS — G89.29: ICD-10-CM

## 2018-04-03 DIAGNOSIS — M54.41: ICD-10-CM

## 2018-04-03 DIAGNOSIS — M25.562: Primary | ICD-10-CM

## 2018-04-03 NOTE — MRI
EXAM:  Lumbar spine MRI without contrast. 

  

HISTORY:  Lumbago with right sciatica. 

  

COMPARISON:  Lumbar spine radiographs 07/03/2015. 

  

TECHNIQUE:  Multiplanar, multisequence MR images were acquired of the lumbar spine without contrast. 


  

FINDINGS:  Conus medullaris ends at L1-2 and is normal signal intensity.  Canal diameter is developme
ntally narrow due to congenitally short pedicles.  Five non-rib bearing lumbar vertebra are present. 
 The lumbar vertebra are normal in height and intrinsic bone marrow signal.  There is mild disc space
 narrowing at L5-S1.  Minor lumbar ventral spondylosis is present. 

  

The partially visualized liver, spleen and kidneys are unremarkable.  There are no paravertebral mass
es. 

  

L1-2:  The intervertebral disc is normal. 

  

L2-3:  There is a minor disc bulge that minimally effaces the ventral thecal sac and narrows the ante
rior inferior neural foramina.  Minor right facet arthropathy is present with a tiny right facet effu
libia and a small synovial cyst along the posterolateral right facet joint.  There is no central canal
 stenosis. 

  

L3-4:  There is a minor disc bulge that is considered physiologic which minimally narrows the inferio
r neural foramina bilaterally. In this patient with a developmentally narrow canal, there is minor le
ft greater than right neural foraminal stenosis. 

  

L4-5:  There is a minor posterior disc bulge that minimally effaces the ventral thecal sac and anteri
or inferior neural foramina bilaterally.  Minor right and mild left facet and ligamentum flavum hyper
trophy is present and there is minor bilateral foraminal stenosis.  There is no central canal stenosi
s. 

  

L5-S1:  There is a minor right posterior disc bulge with marginal osteophytes and a moderate broad-ba
sed right posterolateral disc protrusion that extends into the right neural foramen and encroaches on
 the traversing right L5 nerve.  This posteriorly displaces the right S1 nerve in the lateral recess.
  Minor bilateral facet arthropathy is present.  There is right lateral recess stenosis and mild to m
oderate right neural foraminal stenosis. 

  

IMPRESSION: 

1.  Moderate right posterolateral/lateral disc protrusion L5-S1 that posteriorly displaces the right 
S1 nerve in the lateral recess and encroaches on the traversing right L5 nerve in the neural foramen.
 

2.  Minor lumbar degenerative spondylosis and mild lower facet arthropathy without central canal sten
osis. 

3.  Mild to moderate right L5-S1 neural foraminal stenosis.

## 2018-04-03 NOTE — MRI
EXAM:  MRI left knee without contrast 

  

COMPARISON:  None available. 

  

HISTORY:  Left knee pain.  No history of left knee surgery.  Possible left knee pain related to compe
nsating for an injury of the contralateral/right knee. 

  

TECHNIQUE:  Multiplanar noncontrast MR images of the left knee were acquired using a 1.2 Soha magnet
. 

  

FINDINGS:  No recent radiographs of the left knee are available for comparison and radiographic corre
lation is recommended. 

  

Intrasubstance degeneration of the medial meniscus without a surfacing tear. 

  

There is intrasubstance degeneration of the lateral meniscus with a tear involving the free edge and 
central portion of the inferior articular surface from the body through the anterior and posterior ho
rns with tear extending peripherally.  Intrameniscal/parameniscal cyst measuring up to 5 mm in size o
f the anterior horn and root with a smaller component extending through the body and posterior horn. 


  

Inversion recovery hyperintense signal involving the anterior cruciate ligament with some mild thinni
ng and sagging of the ligament fibers are intact fibers are clearly identified.  This may represent s
equela of mucoid degeneration versus a ligament sprain.  The posterior cruciate ligament and intact. 
 Medial collateral ligament, lateral collateral ligament complex and posterolateral corner ligaments 
are intact.  Mild distal quadriceps and minimal patellar tendinosis.  5 mm lateral subluxation of the
 patella.  Subcutaneous edema anteriorly. 

  

There is chondromalacia patella.  Mild thinning of the cartilage in the medial and lateral compartmen
ts.  No evidence of an acute fracture.  Small joint effusion.  No popliteal cyst or osteochondral bod
y. 

  

IMPRESSION: 

1.  Lateral meniscal tear with intrameniscal/parameniscal cyst as described. 

2.  Mucoid degeneration versus sequela of a sprain of the anterior cruciate ligament though intact fi
bers are clearly identified. 

3.  Mild patellar/quadriceps tendinosis with mild lateral subluxation of the patella. 

4.  Mild tricompartmental osteoarthrosis.  Small joint effusion, nonspecific.

## 2018-04-05 ENCOUNTER — OFFICE VISIT (OUTPATIENT)
Dept: PSYCHIATRY | Age: 33
End: 2018-04-05

## 2018-04-05 DIAGNOSIS — F43.23 ADJUSTMENT DISORDER WITH MIXED ANXIETY AND DEPRESSED MOOD: Primary | ICD-10-CM

## 2018-04-05 PROCEDURE — 99999 PR OFFICE/OUTPT VISIT,PROCEDURE ONLY: CPT | Performed by: COUNSELOR

## 2018-04-11 RX ORDER — CLONAZEPAM 0.5 MG/1
0.5 TABLET ORAL 3 TIMES DAILY PRN
Qty: 90 TABLET | Refills: 0 | Status: SHIPPED | OUTPATIENT
Start: 2018-04-11 | End: 2018-05-11 | Stop reason: SDUPTHER

## 2018-04-20 ENCOUNTER — OFFICE VISIT (OUTPATIENT)
Dept: PSYCHIATRY | Age: 33
End: 2018-04-20
Payer: COMMERCIAL

## 2018-04-20 VITALS
BODY MASS INDEX: 39.07 KG/M2 | OXYGEN SATURATION: 100 % | WEIGHT: 257.8 LBS | SYSTOLIC BLOOD PRESSURE: 135 MMHG | HEIGHT: 68 IN | DIASTOLIC BLOOD PRESSURE: 84 MMHG | HEART RATE: 65 BPM

## 2018-04-20 DIAGNOSIS — F43.23 ADJUSTMENT DISORDER WITH MIXED ANXIETY AND DEPRESSED MOOD: Primary | ICD-10-CM

## 2018-04-20 DIAGNOSIS — F32.9 REACTIVE DEPRESSION: ICD-10-CM

## 2018-04-20 PROCEDURE — 99999 PR OFFICE/OUTPT VISIT,PROCEDURE ONLY: CPT | Performed by: COUNSELOR

## 2018-04-20 PROCEDURE — 90834 PSYTX W PT 45 MINUTES: CPT | Performed by: COUNSELOR

## 2018-04-20 RX ORDER — MELOXICAM 7.5 MG/1
7.5 TABLET ORAL 2 TIMES DAILY
COMMUNITY

## 2018-04-20 RX ORDER — GABAPENTIN 100 MG/1
100 CAPSULE ORAL 3 TIMES DAILY
COMMUNITY

## 2018-05-11 RX ORDER — CLONAZEPAM 0.5 MG/1
0.5 TABLET ORAL 3 TIMES DAILY PRN
Qty: 90 TABLET | Refills: 0 | Status: SHIPPED | OUTPATIENT
Start: 2018-05-11 | End: 2018-06-06 | Stop reason: SDUPTHER

## 2018-06-06 ENCOUNTER — OFFICE VISIT (OUTPATIENT)
Dept: PSYCHIATRY | Age: 33
End: 2018-06-06
Payer: COMMERCIAL

## 2018-06-06 VITALS
BODY MASS INDEX: 39.68 KG/M2 | HEART RATE: 78 BPM | WEIGHT: 261.8 LBS | DIASTOLIC BLOOD PRESSURE: 68 MMHG | SYSTOLIC BLOOD PRESSURE: 123 MMHG | HEIGHT: 68 IN | OXYGEN SATURATION: 100 %

## 2018-06-06 DIAGNOSIS — F41.9 ANXIETY: ICD-10-CM

## 2018-06-06 DIAGNOSIS — F43.23 ADJUSTMENT DISORDER WITH MIXED ANXIETY AND DEPRESSED MOOD: Primary | ICD-10-CM

## 2018-06-06 DIAGNOSIS — F31.9 BIPOLAR 1 DISORDER (HCC): ICD-10-CM

## 2018-06-06 PROCEDURE — 99214 OFFICE O/P EST MOD 30 MIN: CPT | Performed by: NURSE PRACTITIONER

## 2018-06-06 RX ORDER — OXYCODONE HYDROCHLORIDE AND ACETAMINOPHEN 5; 325 MG/1; MG/1
1 TABLET ORAL 4 TIMES DAILY
COMMUNITY
Start: 2018-05-30 | End: 2018-06-10

## 2018-06-06 RX ORDER — BUPROPION HYDROCHLORIDE 450 MG/1
TABLET, FILM COATED, EXTENDED RELEASE ORAL
Qty: 30 TABLET | Refills: 2 | Status: SHIPPED | OUTPATIENT
Start: 2018-06-06 | End: 2018-08-07 | Stop reason: SDUPTHER

## 2018-06-06 RX ORDER — CLONAZEPAM 0.5 MG/1
0.5 TABLET ORAL 3 TIMES DAILY PRN
Qty: 90 TABLET | Refills: 0 | Status: SHIPPED | OUTPATIENT
Start: 2018-06-09 | End: 2018-07-12 | Stop reason: SDUPTHER

## 2018-07-12 ENCOUNTER — OFFICE VISIT (OUTPATIENT)
Dept: PSYCHIATRY | Age: 33
End: 2018-07-12
Payer: COMMERCIAL

## 2018-07-12 DIAGNOSIS — F41.9 ANXIETY: ICD-10-CM

## 2018-07-12 DIAGNOSIS — F43.23 ADJUSTMENT DISORDER WITH MIXED ANXIETY AND DEPRESSED MOOD: ICD-10-CM

## 2018-07-12 DIAGNOSIS — F43.23 ADJUSTMENT DISORDER WITH MIXED ANXIETY AND DEPRESSED MOOD: Primary | ICD-10-CM

## 2018-07-12 PROCEDURE — 90832 PSYTX W PT 30 MINUTES: CPT | Performed by: COUNSELOR

## 2018-07-12 RX ORDER — CLONAZEPAM 0.5 MG/1
0.5 TABLET ORAL 3 TIMES DAILY PRN
Qty: 90 TABLET | Refills: 0 | Status: SHIPPED | OUTPATIENT
Start: 2018-07-12 | End: 2018-08-07 | Stop reason: SDUPTHER

## 2018-07-12 NOTE — TELEPHONE ENCOUNTER
Last OV 06/06/18  Next Appt 08/07/18  Requested Prescriptions     Pending Prescriptions Disp Refills    clonazePAM (KLONOPIN) 0.5 MG tablet 90 tablet 0     Sig: Take 1 tablet by mouth 3 times daily as needed for Anxiety for up to 30 days. .     7/12/2018 9:42 AM   Progress Note        Filiberto María Elena 1985  Psychotherapy Time Spent: 23 min      Psychotherapy Topics: family, financial, health, legal, marital and occupational    Chief Complaint   Patient presents with    Medication Refill         Subjective:  Patient is a 35 yo CM diagnosed with adjustment disorder and presents today for follow-up. Last seen in clinic on 3/30/18 and prior records were reviewed. Last seen by SILVINO Wright, [facility], on 4/20/18. Today patient states, \"I've been off work. I had back surgery 2 weeks ago. \" Patient says \"everything else has gotten a lot better, but my anxiety is still bad. My mood is significantly better. \"  Reports he met with a psychiatrist at [facility] funded and set up by the [employer]. Patient says he was so mad when he left he called his  to tell him he \"was intoxicated on his medication. \" Patient says he knew he was agitated. \"I'm not homicidal, but she made me so mad. She was twisting my own brain right in front of me. \" Patient reports improvement in life. He says \"my life has improved drastically financially. \" Patient says he is off work for the month of June, but looks forward to returning to work. \"I'm excited. I'm ready to jump on it. \" Reports relationship with wife has improved, but she is more stressed due to his recovery from surgery and both of them being in the house. Patient says he hasn't been sleeping well, but thinks it is due to fear he will hurt his back. He has been sleeping in the recliner post surgery. Reports anxiety attack yesterday, but \"it was the first one in awhile. I just started cleaning stuff. \" Patient states he has spent a lot of family time and played some

## 2018-07-12 NOTE — PROGRESS NOTES
Anxious  Depressed  Affect    depressed affect  Thought Content    intact  Thought Process    linear  Associations    logical connections  Insight    Good  Judgment    Intact  Orientation    oriented to person, place, time, and general circumstances  Memory    recent and remote memory intact  Attention/Concentration    intact  Morbid ideation No  Suicide Assessment    no suicidal ideation    History:  Social History     Social History    Marital status:      Spouse name: N/A    Number of children: N/A    Years of education: N/A     Social History Main Topics    Smoking status: Former Smoker    Smokeless tobacco: Current User     Types: Chew    Alcohol use Yes      Comment: occ    Drug use: No    Sexual activity: Yes     Partners: Female     Other Topics Concern    Not on file     Social History Narrative    No narrative on file       Medications:   Current Outpatient Prescriptions   Medication Sig Dispense Refill    clonazePAM (KLONOPIN) 0.5 MG tablet Take 1 tablet by mouth 3 times daily as needed for Anxiety for up to 30 days. . 90 tablet 0    BuPROPion HCl ER, XL, 450 MG TB24 Take 1 capsule every morning 30 tablet 2    gabapentin (NEURONTIN) 100 MG capsule Take 100 mg by mouth 3 times daily. Lorren Lesches meloxicam (MOBIC) 7.5 MG tablet Take 7.5 mg by mouth daily      lamoTRIgine (LAMICTAL) 100 MG tablet Take 1 tablet by mouth daily 30 tablet 3    metoprolol tartrate (LOPRESSOR) 25 MG tablet Take 25 mg by mouth 2 times daily      lisinopril (PRINIVIL;ZESTRIL) 20 MG tablet Take 20 mg by mouth daily      Suvorexant (BELSOMRA) 10 MG TABS Take 10 mg by mouth nightly as needed  . No current facility-administered medications for this visit. Social History:   Social History     Social History    Marital status:      Spouse name: N/A    Number of children: N/A    Years of education: N/A     Occupational History    Not on file.      Social History Main Topics    Smoking status: Former Smoker    Smokeless tobacco: Current User     Types: Chew    Alcohol use Yes      Comment: occ    Drug use: No    Sexual activity: Yes     Partners: Female     Other Topics Concern    Not on file     Social History Narrative    No narrative on file       TOBACCO:   reports that he has quit smoking. His smokeless tobacco use includes Chew. ETOH:   reports that he drinks alcohol. Family History:   Family History   Problem Relation Age of Onset    Family history unknown: Yes       Diagnosis:    Adjustment D/O      Diagnosis Date    Anxiety     Depression     Hypertension      Problems related to the social environment, Occupational problems and Other psychosocial and environmental problems    Plan:  1. Continue medication management  2. CBT to target depression/anxiety  3.  Discuss positive steps to wellbeing    Pt interventions:  Trained in relaxation strategies, Provided education, Discussed self-care (sleep, nutrition, rewarding activities, social support, exercise), Discussed use of imagery, distractions, relaxation, mood management, communication training, questioning unhelpful thinking, problem-solving, and behavioral activation to manage pain, Supportive techniques, Emphasized self-care as important for managing overall health and CBT to target depression and anxiety      [facility]

## 2018-07-12 NOTE — TELEPHONE ENCOUNTER
Called pt Rx into  [facility] / [facility] Last, [address] [phone]  [id number] [facility] [address]  Phone: [phone] Fax: [phone]  per Shantell Cuellar MA.    Spoke with Driss Man, [address] notified

## 2018-08-07 ENCOUNTER — OFFICE VISIT (OUTPATIENT)
Dept: PSYCHIATRY | Age: 33
End: 2018-08-07
Payer: COMMERCIAL

## 2018-08-07 VITALS
OXYGEN SATURATION: 98 % | BODY MASS INDEX: 38.4 KG/M2 | SYSTOLIC BLOOD PRESSURE: 136 MMHG | WEIGHT: 253.4 LBS | DIASTOLIC BLOOD PRESSURE: 65 MMHG | HEART RATE: 73 BPM | HEIGHT: 68 IN

## 2018-08-07 DIAGNOSIS — F43.23 ADJUSTMENT DISORDER WITH MIXED ANXIETY AND DEPRESSED MOOD: ICD-10-CM

## 2018-08-07 DIAGNOSIS — F41.9 ANXIETY: Primary | ICD-10-CM

## 2018-08-07 DIAGNOSIS — F32.9 REACTIVE DEPRESSION: ICD-10-CM

## 2018-08-07 PROCEDURE — 99214 OFFICE O/P EST MOD 30 MIN: CPT | Performed by: NURSE PRACTITIONER

## 2018-08-07 RX ORDER — LAMOTRIGINE 100 MG/1
100 TABLET ORAL DAILY
Qty: 30 TABLET | Refills: 3 | Status: SHIPPED | OUTPATIENT
Start: 2018-08-07

## 2018-08-07 RX ORDER — CLONAZEPAM 0.5 MG/1
0.5 TABLET ORAL 3 TIMES DAILY PRN
Qty: 90 TABLET | Refills: 0 | Status: SHIPPED | OUTPATIENT
Start: 2018-08-11 | End: 2018-09-21 | Stop reason: SDUPTHER

## 2018-08-07 RX ORDER — BUPROPION HYDROCHLORIDE 450 MG/1
TABLET, FILM COATED, EXTENDED RELEASE ORAL
Qty: 30 TABLET | Refills: 3 | Status: SHIPPED | OUTPATIENT
Start: 2018-08-07 | End: 2018-10-16

## 2018-08-07 NOTE — PROGRESS NOTES
STACIA   BuPROPion HCl ER, XL, 450 MG TB24 Take 1 capsule every morning 6/6/18  Yes STACIA Pineda   gabapentin (NEURONTIN) 100 MG capsule Take 100 mg by mouth 3 times daily. .   Yes Historical Provider, MD   meloxicam (MOBIC) 7.5 MG tablet Take 7.5 mg by mouth daily   Yes Historical Provider, MD   lamoTRIgine (LAMICTAL) 100 MG tablet Take 1 tablet by mouth daily 3/30/18  Yes SATCIA Pineda   metoprolol tartrate (LOPRESSOR) 25 MG tablet Take 25 mg by mouth 2 times daily   Yes Historical Provider, MD   lisinopril (PRINIVIL;ZESTRIL) 20 MG tablet Take 20 mg by mouth daily   Yes Historical Provider, MD   Suvorexant (BELSOMRA) 10 MG TABS Take 10 mg by mouth nightly as needed  . Yes Historical Provider, MD       MSE:  Patient is  A & O x3. Appearance:  street clothes and in chair appropriately dressed for season and age. Cognition:  Recent memory intact , remote memory intact , good fund of knowledge, average  intelligence level. Speech:  Angry/agitated sounding  Language: Naming: intact; Word Finding: intact  Conversation slight paranoia - thinks boss put a GPS put on all the cars. Behavior:  Tense, Irritable and Good eye contact  Mood: depressed, irritable, anxious and angry  Affect: blunted  Thought Content: no evidence of overt psychosis, delusional thought or suicidal /homicidal ideation or plan  Thought Process: linear, goal directed and coherent  Judgement Insight:  normal and appropriate  Gait and Station:normal gait and station and normal balance   Musculoskeletal: WNL      Assesment:   1. Anxiety    2. Adjustment disorder with mixed anxiety and depressed mood    3. Reactive depression        Plan:  Continue medications as prescribed. 1. The risks, benefits, side effects, indications, contraindications, and adverse effects of the medications have been discussed. Yes.  2. The pt has verbalized understanding and has capacity to give informed consent.   3. The Dona Warren report has been reviewed according to [facility] regulations. 4. Supportive therapy offered. 5. Follow up: Return in about 3 months (around 11/7/2018). 6. The patient has been advised to call with any problems. 7. Controlled substance Treatment Plan: maintenance dose. 8. The above listed medications have been continued, modifications in meds and other orders/labs as follows: No orders of the defined types were placed in this encounter. No orders of the defined types were placed in this encounter.       9. Additional comments:      Renetta Garland, TRESSA-BC

## 2018-08-29 ENCOUNTER — TELEPHONE (OUTPATIENT)
Dept: PSYCHIATRY | Age: 33
End: 2018-08-29

## 2018-08-29 NOTE — TELEPHONE ENCOUNTER
Patient called stating that both of his insurances have stopped paying for Wellbutrin, he cannot afford it out of pocket and wants to know if there could be something else he can take that would not be as high cost.

## 2018-09-19 NOTE — [id number]
Date of Note: 09/18/18


Visit #: 1


Date of Evaluation: 09/18/18


Payer Source: Medicaid


Surgery Performed?: Yes (partial menisectomy)


Date of Procedure: 09/12/18


Treatment Diagnosis: Lateral meniscus tear


History of Condition/Mechanism of Injury:: pt states he has had a chronic hx of 

pain in his knee which increased after having back surgery


Prior Level of Function.....Patient was independent with: ADL's, Self Care, Work

/Vocation, Ambulation/Mobility, Community Integration/Access


Functional Limitations: Lifting, Carrying, Standing, Squatting, Ambulation, 

Community Access/Integration


Current Subjective/complaints:: pt states he has been using only one crutch 

because it was easier to get around. States he wants to get better and back to 

100% so he can go to state [employer].


Treatment Side (optional): Left


*Precautions: n/a





Medical History


Medical History: Hypertension, CVA/TIA (stroke 10 yrs ago)


Surgical History Comments:: R knee surgery, R forearm, back surgery


Hx Home Medications: percocet, lisinopril, clonazapam, wellbutrin


Patient's Goals: return to working as a .





Pain Assessment





- Pain Description


Pain Location: L knee


Pain Description: Aching


Current Pain Intensity: 4





Functional Outcome Measure


LE Functional Scale: 16





- G Codes & Severity Modifier


G Codes & Modifier: na


Source of G Code score: n/a





Observation





- Observation


Posture: Forward Head, Rounded Shoulders


Handedness: Right


Girth Measurement Lower: LLE knee 44cm, 10cm above knee 52.3 cm.  RLE knee 

41.8cm, 10cm above knee 51.7 cm





Gait





- Gait Pattern


General Gait Pattern Observation: Antalgic Gait, Short Stance Time (L), 

Decrease Stride Lngth (L)


Gait Comments: pt amb with antalgic gait pattern PWB LLE. pt is to be PWB until 

9/26/18





General


Range of Motion: BUE WFL's.  RLE WFL's


Knee ROM: Right WFL's


Knee Muscle Strength: Right WFL's





- Left Knee ROM


Left Knee Extension: -7


Left Knee Flexion: 77


Knee ROM Limitations: Soft Tissue Tightness, Muscle Weakness, Pain





- Left Knee Strength


Left Knee Extension: 3-   Fair-


Left Knee Flexion: 3-   Fair-





Palpation


Palpation Findings: Tenderness


Comments:: tenderness noted in medial L knee.





Sensation





- Sensation


Right Upper Extremity: Intact/Normal


Left Upper Extremity: Intact/Normal


Right Lower Extremity: Impaired (n/t R lower leg after having back issues)


Left Lower Extremity: Intact/Normal





Balance





- Sitting Balance


Static Sitting Balance: Normal


Dynamic Sitting Balance: Normal





- Standing Balance


Static Standing Balance: Good


Dynamic Standing Balance: Good





- Heat/Cryotherapy


Treatment: Cryotherapy


Comments:: L knee





Interventions





- Exercise/Activities/Manual Therapy


Exercises/Activities: pt performed QS, Hip abd/add, SAQ, hamstring stretch


Manual Therapy: n/a





- Charges


Timed Code Treatment Minutes: 49


Total Treatment Time: 58


Procedures billed for this date of service:: eval low





EVALUATION COMPLEXITY LEVEL


EVALUATION COMPLEXITY LEVEL: HISTORY: Low (htn, knee pain), EXAM OF BODY SYSTEMS

: Medium (pain, ROM, balance, gait), CLINICAL PRESENTATION: Low, CLINICAL 

DECISION MAKING: Low





Assessment


Assessment: pt presents with decreased strength, ROM L knee, pt also with pain 

and edema LLE s/p partial menisectomy


Patient Education: Home Exercise Program, Education of Plan of Care


Rehab Potential: Good





Short Term Goals


Goal #1: Improve L knee ROM flex 90 ext -5


Goal to be met by: 10/02/18


Goal #2: Decrease edema LLE equal to RLE


Goal to be met by: 10/02/18


Goal #3: pt amb with AAD in dept with no LOB with improved sequencing


Goal to be met by: 10/02/18


Goal #4: pt independent with initial HEP


Goal to be met by: 10/02/18





Long Term Goals


Goal #1: Improve L knee ROM flex 100 ext 0


Goal to be met by: 10/19/18


Goal #2: Decrease pain < 4/10 L knee 


Goal to be met by: 10/19/18


Goal #3: pt amb community distances independently w decreased antalgic gait


Goal to be met by: 10/19/18


Goal #4: Improve strength L LE quads 4+/5


Goal to be met by: 10/19/18





Plan





- Treatment to be Provided


Procedures: Therapeutic Exercises, Therapeutic Activity, Gait Training, Manual 

Therapy, Patient Education


Modalities: Cryotherapy


Other:: no electrical modalities due to pt states they are testing for CA.





- Treatment Plan


Frequency: 2 X week


Duration: 4 weeks


ORDER # VISITS AND/OR THROUGH DATE: 10/19/18





- Treatment Code


(1) Knee pain


Code(s): M25.569 - PAIN IN UNSPECIFIED KNEE   


Qualifiers: 


   Chronicity: acute   Laterality: left   Qualified Code(s): M25.562 - Pain in 

left knee   





(2) Joint effusion of knee


Qualifiers: 


   Laterality: left   Qualified Code(s): M25.462 - Effusion, left knee   





(3) Joint stiffness of knee


Qualifiers: 


   Laterality: left   Qualified Code(s): M25.662 - Stiffness of left knee, not 

elsewhere classified

## 2018-09-21 DIAGNOSIS — F43.23 ADJUSTMENT DISORDER WITH MIXED ANXIETY AND DEPRESSED MOOD: ICD-10-CM

## 2018-09-21 DIAGNOSIS — F41.9 ANXIETY: ICD-10-CM

## 2018-09-21 RX ORDER — CLONAZEPAM 0.5 MG/1
0.5 TABLET ORAL 3 TIMES DAILY PRN
Qty: 90 TABLET | Refills: 0 | Status: SHIPPED | OUTPATIENT
Start: 2018-09-21 | End: 2018-10-19 | Stop reason: DRUGHIGH

## 2018-09-21 NOTE — TELEPHONE ENCOUNTER
Pt is requesting a refill of the following Rx. Pt was last seen on 8-07-18 and has a follow up on 10-16-18. Requested Prescriptions     Pending Prescriptions Disp Refills    clonazePAM (KLONOPIN) 0.5 MG tablet 90 tablet 0     Sig: Take 1 tablet by mouth 3 times daily as needed for Anxiety for up to 30 days. .     9/21/2018 1:42 PM   Progress Note        Jp Polo 1985  Psychotherapy Time Spent: 24 min      Psychotherapy Topics: family, financial, health, legal, marital and occupational    Chief Complaint   Patient presents with    Medication Refill         Subjective:  Patient is a 35 yo CM diagnosed with adjustment disorder, JOSE, and reactive depression and presents today for follow-up. Last seen in clinic on 6/6/18 and prior records were reviewed. Today patient states, \"I'm just fed up with life. I'm sick of worrying about the stupid . I feel like I do good and I do bad. \" Patient is currently working full times. \"I don't talk to anybody anymore. I've been wrote up 3 times and suspended because we got a new . \" Patient says his [place] environment is very hostile. \" The home environment is \"great. \" Then patient says the past couple of days have been hard because his wife is on his period and he is in constant pain. Says they had gone for 1.5 months before that \"without a cross word. \" Patient says he hasn't had a drink in about 4 months. Patient says he has been swimming a lot. Appetite has decreased. Patient is considering leaving his job and going to work for [employer]. Patient says he quit taking BP medication a week before surgery. BP today was 136/65. Patient reports side effects as follows: none. No evidence of EPS, no cogwheeling or abnormal motor movements. Absent  suicidal ideation. Reports compliance with medications as good .      Review of Systems - 12 point review negative except anxiety, anger, irritability  History obtained via chart review and patient  PCP is Geovanna Rowley MD      Current Meds:    Prior to Admission medications    Medication Sig Start Date End Date Taking? Authorizing Provider   lamoTRIgine (LAMICTAL) 100 MG tablet Take 1 tablet by mouth daily 8/7/18   STACIA Jin   BuPROPion HCl ER, XL, 450 MG TB24 Take 1 capsule every morning 8/7/18   STACIA Jin   clonazePAM (KLONOPIN) 0.5 MG tablet Take 1 tablet by mouth 3 times daily as needed for Anxiety for up to 30 days. . 8/11/18 9/10/18  STACIA Jin   gabapentin (NEURONTIN) 100 MG capsule Take 100 mg by mouth 3 times daily. Gabby Monroy Historical Provider, MD   meloxicam (MOBIC) 7.5 MG tablet Take 7.5 mg by mouth daily    Historical Provider, MD   metoprolol tartrate (LOPRESSOR) 25 MG tablet Take 25 mg by mouth 2 times daily    Historical Provider, MD   lisinopril (PRINIVIL;ZESTRIL) 20 MG tablet Take 20 mg by mouth daily    Historical Provider, MD   Suvorexant (BELSOMRA) 10 MG TABS Take 10 mg by mouth nightly as needed  . Historical Provider, MD       MSE:  Patient is  A & O x3. Appearance:  street clothes and in chair appropriately dressed for season and age. Cognition:  Recent memory intact , remote memory intact , good fund of knowledge, average  intelligence level. Speech:  Angry/agitated sounding  Language: Naming: intact; Word Finding: intact  Conversation slight paranoia - thinks boss put a GPS put on all the cars. Behavior:  Tense, Irritable and Good eye contact  Mood: depressed, irritable, anxious and angry  Affect: blunted  Thought Content: no evidence of overt psychosis, delusional thought or suicidal /homicidal ideation or plan  Thought Process: linear, goal directed and coherent  Judgement Insight:  normal and appropriate  Gait and Station:normal gait and station and normal balance   Musculoskeletal: WNL      Assesment:   1. Anxiety    2. Adjustment disorder with mixed anxiety and depressed mood        Plan:  Continue medications as prescribed.    1.

## 2018-09-25 ENCOUNTER — HOSPITAL ENCOUNTER (OUTPATIENT)
Dept: HOSPITAL 58 - REHAB | Age: 33
LOS: 5 days | End: 2018-09-30
Attending: ORTHOPAEDIC SURGERY

## 2018-09-25 VITALS — BODY MASS INDEX: 39.1 KG/M2

## 2018-09-25 DIAGNOSIS — M25.562: Primary | ICD-10-CM

## 2018-09-25 DIAGNOSIS — Z47.89: ICD-10-CM

## 2018-09-25 DIAGNOSIS — Z98.890: ICD-10-CM

## 2018-09-25 DIAGNOSIS — M25.662: ICD-10-CM

## 2018-09-25 DIAGNOSIS — M25.462: ICD-10-CM

## 2018-09-25 NOTE — RS.OPPTDN
Subjective


Date of Note: 09/25/18


Visit #: 2


Date of Evaluation: 09/18/18


Payer Source: Medicaid


Treatment Diagnosis: Lateral meniscus tear


Current Subjective/complaints:: Patient reports doing the exercises frequently,

feels the knee is getting better.He enters clinic without use of crutch today.


*Precautions: n/a





Pain Assessment





- Pain Description


Pain Location: L knee


Pain Description: Dull, Aching


Current Pain Intensity: minimal 





- Heat/Cryotherapy


Treatment: Cryotherapy (15 mins. after exercises)





Interventions





- Exercise/Activities/Manual Therapy


Exercises/Activities: 25 mins. total of multiple reps. of pt performed QS, Hip 

abd/add, SAQ, hamstring stretch,SLR's ,SLR's with external rotation of the hip 

for VMO ,LAQ's .Reviewed the safety precautions ,open chain vs, closed chain 

activities.


Total minutes of Exercise: 25


Manual Therapy: n/a


HOME EXERCISE PROGRAM: ankle pumps,QS,SAQ,SLR's,LAQ's.AVOID EXCESSIVE CLOSED 

CHAIN EXERCISES at this time.(13 days post-op )





- Charges


Timed Code Treatment Minutes: 25


Total Treatment Time: 40


Procedures billed for this date of service:: ex 2 ,cp


Assessment: Patient has good return demo of each exercise today.He has moderate 

edema,minimal warmth present in the L knee.He has moderate hamstring tightness 

present,can benefit from stretching the anterior /posterior aspect to improve 

the mechanics of the knee.


Patient Education: Education of diagnosis, Body/Joint mechanics, Home Exercise 

Program, Home Safety, Activity Modification, Education of Plan of Care


Patient demonstrates compliance with HEP?: Yes





Short Term Goals


Goal #1: Improve L knee ROM flex 90 ext -5


Goal to be met by: 10/02/18


Progress towards Goal:: Progressing


Goal #2: Decrease edema LLE equal to RLE


Goal to be met by: 10/02/18


Progress towards Goal:: Progressing


Goal #3: pt amb with AAD in dept with no LOB with improved sequencing


Goal to be met by: 10/02/18


Progress towards Goal:: Progressing


Goal #4: pt independent with initial HEP


Goal to be met by: 10/02/18


Progress towards Goal:: Progressing





Long Term Goals


Goal #1: Improve L knee ROM flex 100 ext 0


Goal to be met by: 10/19/18


Goal #2: Decrease pain < 4/10 L knee 


Goal to be met by: 10/19/18


Goal #3: pt amb community distances independently w decreased antalgic gait


Goal to be met by: 10/19/18


Goal #4: Improve strength L LE quads 4+/5


Goal to be met by: 10/19/18





Plan


PLAN OF CARE EXPIRES ON:: 10/19/18


ORDER # VISITS AND/OR THROUGH DATE: 10/19/18


PLAN: Continue PT to strengthening the L knee ,reduce pain /edema .

## 2018-10-16 ENCOUNTER — OFFICE VISIT (OUTPATIENT)
Dept: PSYCHIATRY | Age: 33
End: 2018-10-16
Payer: COMMERCIAL

## 2018-10-16 VITALS
OXYGEN SATURATION: 99 % | HEIGHT: 68 IN | SYSTOLIC BLOOD PRESSURE: 137 MMHG | DIASTOLIC BLOOD PRESSURE: 87 MMHG | HEART RATE: 81 BPM | BODY MASS INDEX: 37.13 KG/M2 | WEIGHT: 245 LBS

## 2018-10-16 DIAGNOSIS — F41.9 ANXIETY: Primary | ICD-10-CM

## 2018-10-16 DIAGNOSIS — F31.9 BIPOLAR 1 DISORDER (HCC): ICD-10-CM

## 2018-10-16 DIAGNOSIS — F32.9 REACTIVE DEPRESSION: ICD-10-CM

## 2018-10-16 DIAGNOSIS — F43.23 ADJUSTMENT DISORDER WITH MIXED ANXIETY AND DEPRESSED MOOD: ICD-10-CM

## 2018-10-16 PROCEDURE — 99214 OFFICE O/P EST MOD 30 MIN: CPT | Performed by: NURSE PRACTITIONER

## 2018-10-16 RX ORDER — CLONAZEPAM 0.5 MG/1
0.5 TABLET, ORALLY DISINTEGRATING ORAL DAILY
Qty: 30 TABLET | Refills: 0 | Status: SHIPPED | OUTPATIENT
Start: 2018-10-16 | End: 2018-10-19 | Stop reason: DRUGHIGH

## 2018-10-16 RX ORDER — RISPERIDONE 1 MG/1
1 TABLET, FILM COATED ORAL 2 TIMES DAILY
Qty: 60 TABLET | Refills: 3 | Status: SHIPPED | OUTPATIENT
Start: 2018-10-16

## 2018-10-16 NOTE — PROGRESS NOTES
10/16/2018 12:20 PM   Progress Note        Tj Wiggins 1985  Psychotherapy Time Spent: 22 min      Psychotherapy Topics: family, financial, health, marital and occupational    Chief Complaint   Patient presents with    Anxiety    Manic Behavior         Subjective:  Patient is a 27 yo CM diagnosed with adjustment disorder, JOSE, and reactive depression and presents today for follow-up. Last seen in clinic on 8/7/18 and prior records were reviewed. Today patient states, Don Robbins are you with turning your light off. My anxiety is terrible. The depression has subsided. I've had some ups and downs issues. It doesn't break me though. I don't harp on it. \" Patient asks to come off the lamotrigine and Wellbutrin because \"they are too expensive. \" Patient reports he quit the [facility] department. Patient says \"I'm still having my manic episodes with the OCD. \" Patient has not taken wellbutrin over a month. Patient has been working out again and focusing on physical health. Sleep is fair. Patient reports side effects as follows: irritability. No evidence of EPS, no cogwheeling or abnormal motor movements. Absent suicidal ideation. Reports compliance with medications as fair . Review of Systems - 12 point review negative except anxiety, irritability  History obtained via chart review and patient  PCP is Anton Zavala      Current Meds:    Prior to Admission medications    Medication Sig Start Date End Date Taking? Authorizing Provider   risperiDONE (RISPERDAL) 1 MG tablet Take 1 tablet by mouth 2 times daily 10/16/18  Yes STACIA Alvarado   clonazePAM (KLONOPIN) 0.5 MG disintegrating tablet Take 1 tablet by mouth daily for 30 days. . 10/16/18 11/15/18 Yes STACIA Alvarado   clonazePAM (KLONOPIN) 0.5 MG tablet Take 1 tablet by mouth 3 times daily as needed for Anxiety for up to 30 days. . 9/21/18 10/21/18 Yes STACIA Alvarado   lamoTRIgine (LAMICTAL) 100 MG tablet Take 1 tablet by up: Return in about 2 months (around 12/16/2018). 6. The patient has been advised to call with any problems. 7. Controlled substance Treatment Plan: this is a maintenance dose. .  8. The above listed medications have been continued, modifications in meds and other orders/labs as follows:      Orders Placed This Encounter   Medications    risperiDONE (RISPERDAL) 1 MG tablet     Sig: Take 1 tablet by mouth 2 times daily     Dispense:  60 tablet     Refill:  3    clonazePAM (KLONOPIN) 0.5 MG disintegrating tablet     Sig: Take 1 tablet by mouth daily for 30 days. .     Dispense:  30 tablet     Refill:  0      No orders of the defined types were placed in this encounter.       9. Additional comments:      TRESSA Alvarado-BC

## 2018-10-19 DIAGNOSIS — F41.9 ANXIETY: Primary | ICD-10-CM

## 2018-10-19 RX ORDER — CLONAZEPAM 0.5 MG/1
0.5 TABLET, ORALLY DISINTEGRATING ORAL 4 TIMES DAILY
Qty: 60 TABLET | Refills: 0 | Status: SHIPPED | OUTPATIENT
Start: 2018-10-19 | End: 2018-11-19 | Stop reason: SDUPTHER

## 2018-10-25 ENCOUNTER — HOSPITAL ENCOUNTER (OUTPATIENT)
Dept: HOSPITAL 58 - AMBL | Age: 33
Discharge: HOME | End: 2018-10-25
Attending: INTERNAL MEDICINE

## 2018-10-25 ENCOUNTER — HOSPITAL ENCOUNTER (OUTPATIENT)
Age: 33
Setting detail: OBSERVATION
Discharge: HOME | End: 2018-10-26
Attending: SURGERY | Admitting: SURGERY
Payer: MEDICAID

## 2018-10-25 ENCOUNTER — HOSPITAL ENCOUNTER (EMERGENCY)
Dept: HOSPITAL 58 - ED | Age: 33
Discharge: ANOTHER HOSPITAL | End: 2018-10-25

## 2018-10-25 VITALS — BODY MASS INDEX: 38.1 KG/M2

## 2018-10-25 VITALS — DIASTOLIC BLOOD PRESSURE: 70 MMHG | SYSTOLIC BLOOD PRESSURE: 143 MMHG | TEMPERATURE: 98.7 F

## 2018-10-25 DIAGNOSIS — R10.9: Primary | ICD-10-CM

## 2018-10-25 DIAGNOSIS — K35.31 ACUTE APPENDICITIS WITH LOCALIZED PERITONITIS AND GANGRENE, WITHOUT PERFORATION OR ABSCESS: Primary | ICD-10-CM

## 2018-10-25 DIAGNOSIS — K35.20: Primary | ICD-10-CM

## 2018-10-25 DIAGNOSIS — Z72.0: ICD-10-CM

## 2018-10-25 PROCEDURE — G0378 HOSPITAL OBSERVATION PER HR: HCPCS

## 2018-10-25 PROCEDURE — 96367 TX/PROPH/DG ADDL SEQ IV INF: CPT

## 2018-10-25 PROCEDURE — 80053 COMPREHEN METABOLIC PANEL: CPT

## 2018-10-25 PROCEDURE — 96375 TX/PRO/DX INJ NEW DRUG ADDON: CPT

## 2018-10-25 PROCEDURE — 36415 COLL VENOUS BLD VENIPUNCTURE: CPT

## 2018-10-25 PROCEDURE — 93010 ELECTROCARDIOGRAM REPORT: CPT

## 2018-10-25 PROCEDURE — 6370000000 HC RX 637 (ALT 250 FOR IP): Performed by: SURGERY

## 2018-10-25 PROCEDURE — 2500000003 HC RX 250 WO HCPCS: Performed by: SURGERY

## 2018-10-25 PROCEDURE — 1210000000 HC MED SURG R&B

## 2018-10-25 PROCEDURE — 96365 THER/PROPH/DIAG IV INF INIT: CPT

## 2018-10-25 PROCEDURE — 6360000002 HC RX W HCPCS: Performed by: SURGERY

## 2018-10-25 PROCEDURE — 85025 COMPLETE CBC W/AUTO DIFF WBC: CPT

## 2018-10-25 PROCEDURE — 93005 ELECTROCARDIOGRAM TRACING: CPT

## 2018-10-25 PROCEDURE — 99285 EMERGENCY DEPT VISIT HI MDM: CPT

## 2018-10-25 PROCEDURE — 2580000003 HC RX 258: Performed by: SURGERY

## 2018-10-25 RX ORDER — TRAMADOL HYDROCHLORIDE 50 MG/1
50 TABLET ORAL EVERY 6 HOURS PRN
COMMUNITY

## 2018-10-25 RX ORDER — MORPHINE SULFATE/0.9% NACL/PF 1 MG/ML
2 SYRINGE (ML) INJECTION
Status: DISCONTINUED | OUTPATIENT
Start: 2018-10-25 | End: 2018-10-26

## 2018-10-25 RX ORDER — ACETAMINOPHEN 325 MG/1
650 TABLET ORAL ONCE
Status: COMPLETED | OUTPATIENT
Start: 2018-10-25 | End: 2018-10-25

## 2018-10-25 RX ORDER — ONDANSETRON 2 MG/ML
4 INJECTION INTRAMUSCULAR; INTRAVENOUS EVERY 4 HOURS PRN
Status: DISCONTINUED | OUTPATIENT
Start: 2018-10-25 | End: 2018-10-26 | Stop reason: HOSPADM

## 2018-10-25 RX ORDER — DEXTROSE, SODIUM CHLORIDE, SODIUM LACTATE, POTASSIUM CHLORIDE, AND CALCIUM CHLORIDE 5; .6; .31; .03; .02 G/100ML; G/100ML; G/100ML; G/100ML; G/100ML
INJECTION, SOLUTION INTRAVENOUS CONTINUOUS
Status: DISCONTINUED | OUTPATIENT
Start: 2018-10-25 | End: 2018-10-26 | Stop reason: HOSPADM

## 2018-10-25 RX ADMIN — ACETAMINOPHEN 650 MG: 325 TABLET ORAL at 22:44

## 2018-10-25 RX ADMIN — METRONIDAZOLE 500 MG: 500 INJECTION, SOLUTION INTRAVENOUS at 22:00

## 2018-10-25 RX ADMIN — SODIUM CHLORIDE, SODIUM LACTATE, POTASSIUM CHLORIDE, CALCIUM CHLORIDE AND DEXTROSE MONOHYDRATE: 5; 600; 310; 30; 20 INJECTION, SOLUTION INTRAVENOUS at 22:44

## 2018-10-25 RX ADMIN — Medication 1 G: at 22:30

## 2018-10-25 ASSESSMENT — PAIN SCALES - GENERAL: PAINLEVEL_OUTOF10: 0

## 2018-10-25 NOTE — ED.PDOC
General


ED Provider: 


Dr. LEXUS FERNANDEZ





Chief Complaint: Abdominal Pain


Stated Complaint: Severe Stomach and abdominal pain.  Onset last evening and 

persisted throughout today with worsened. NO Ride to hospital so called 

ambulance.


Time Seen by Physician: 18:45


Mode of Arrival: Walk-In


Information Source: Patient


Exam Limitations: No limitations


Primary Care Provider: 


TORIBIO ROSALES





Nursing and Triage Documentation Reviewed and Agree: Yes


Does patient meet sepsis criteria?: No


System Inflammatory Response Syndrome: Not Applicable


Sepsis Protocol: 


For patient's 13 years and over:





Temp is 96.8 and below  and greater


Pulse >90 BPM


Resp >20/minute


Acutely Altered Mental Status





Are patient's symptoms suggestive of a new infection, such as:


   -Pneumonia


   -Skin, Soft Tissue


   -Endocarditis


   -UTI


   -Bone, Joint Infection


   -Implantable Device


   -Acute Abdominal Infection


   -Wound Infection


   -Meningitis


   -Blood Stream Catheter Infection


   -Unknown








GI Complaint Exam





- Abdominal Pain Complaint/Exam


Onset: Sudden


Duration: 24 hrs


Symptoms Are: Worse


Timing: Constant


Initial Severity: Moderate


Current Severity: Severe


Location of Pain: RLQ


Radiates To: Reports: Inguinal


Character: Reports: Aching, Throbbing, Cramping


Aggravating: Reports: Movement, Deep breaths, Position (Has not eaten today)


Alleviating: Reports: None


Associated Signs and Symptoms: Reports: Diaphoresis, Back pain


AAA Risk Factors: Reports: None


Cardiac Risk Factors: Reports: None


Testicular Torsion Risk Factors: Reports: None


Surgical Obstruction Risk Factors: Reports: None


Related Surgical History: Reports: None


Abdominal Findings: Present: Rebound tenderness, Peritoneal signs, McBurney's 

Point tender


Genitalia Exam: Present: Normal findings


Male Body Picture: 


  __________________________














  __________________________





 1 - RLQ location of pain





Differential Diagnoses: Appendicitis





Review of Systems





- Review Of Systems


Constitutional: Reports: No symptoms


Eyes: Reports: No symptoms


Ears, Nose, Mouth, Throat: Reports: No symptoms


Respiratory: Reports: No symptoms


Cardiac: Reports: No symptoms


GI: Reports: No symptoms, Abdominal pain, Nausea, Vomiting


: Reports: No symptoms


Musculoskeletal: Reports: No symptoms


Skin: Reports: No symptoms


Neurological: Reports: No symptoms


Endocrine: Reports: No symptoms


Hematologic/Lymphatic: Reports: No symptoms


All Other Systems: Reviewed and Negative





Past Medical History





- Past Medical History


Previously Healthy: Yes


Endocrine: Reports: None


Cardiovascular: Reports: Hypertension


Respiratory: Reports: None


Hematological: Reports: None


Gastrointestinal: Reports: None


Genitourinary: Reports: None


Neuro/Psych: Reports: Depression, Bipolar Disorder


Musculoskeletal: Reports: None


Cancer: Reports: None





- Surgical History


General Surgical History: Reports: Orthopedic





- Family History


Family History: Reports: Unknown





- Social History


Smoking Status: Chews tobacco


Hx Substance Use: No


Alcohol Screening: Occasionally





- Immunizations


Tetanus Shot up to Date: Yes





Physical Exam





- Physical Exam


Appearance: Well-appearing, Ill-appearing, Well-nourished


Ill-appearing: Moderate


Pain Distress: Severe


Eyes: CESAR, EOMI, Conjunctiva clear


ENT: Ears normal, Nose normal, Oropharynx normal


Respiratory: Airway patent, Breath sounds clear, Breath sounds equal, 

Respirations nonlabored


Cardiovascular: RRR, Pulses normal, No rub, No murmur


GI/: Soft, No masses, Bowel sounds normal, No Organomegaly, Tender, Bowel 

sounds hypoactive


Musculoskeletal: Normal strength, ROM intact, No edema, No calf tenderness


Skin: Warm, Dry, Normal color


Neurological: Sensation intact, Motor intact, Reflexes intact, Cranial nerves 

intact, Alert, Oriented


Psychiatric: Affect appropriate, Mood appropriate





Interpretation





- Radiology Interpretation


Radiology Interpretation By: Radiologist


Exam Interpreted: CT Scan (abdomen-acute appendicitis)





Re-Evaluation





- Re-Evaluation


Time of Re-Evaluation: 19:30


Status: Improved


Vital Signs Stable: Yes


Lungs: Clear


Skin: Warm and Dry


Neuro: Alert and Oriented X3


CV: RRR





Critical Care Note





- Critical Care Note


Total Time (mins): 60





Course





- Course


Hematology/Chemistry: 


 10/25/18 19:10





 10/25/18 19:10


Orders, Labs, Meds: 


Lab Review











  10/25/18 10/25/18





  19:10 19:10


 


WBC  14.23 H 


 


RBC  4.49 L 


 


Hgb  13.0 L 


 


Hct  36.7 L 


 


MCV  81.7 


 


MCH  29.0 


 


MCHC  35.4 


 


RDW Coeff of Vinny  12.6 


 


Plt Count  214 


 


Immature Gran % (Auto)  0.3 


 


Neut % (Auto)  80.9 


 


Lymph % (Auto)  10.7 


 


Mono % (Auto)  7.9 


 


Eos % (Auto)  0.1 


 


Baso % (Auto)  0.1 


 


Immature Gran # (Auto)  0.0 


 


Neut # (Auto)  11.5 H 


 


Lymph # (Auto)  1.5 


 


Mono # (Auto)  1.1 


 


Eos # (Auto)  0.0 


 


Baso # (Auto)  0.0 


 


Sodium   137.1


 


Potassium   3.56


 


Chloride   102.2


 


Carbon Dioxide   25.9


 


Anion Gap   12.56


 


BUN   10.1


 


Creatinine   0.71


 


Estimated GFR (MDRD)   127.00


 


BUN/Creatinine Ratio   14.22


 


Glucose   106.5 H


 


Calcium   8.91


 


Total Bilirubin   0.50


 


AST   57.5


 


ALT   21.4


 


Alkaline Phosphatase   61.4


 


Total Protein   7.23


 


Albumin   4.36


 


Globulin   2.87


 


Albumin/Globulin Ratio   1.51








Orders











 Category Date Time Status


 


 EKG-(ED ONLY) Stat CARDIO  10/25/18 19:03 Completed


 


 CBC W/ AUTO DIFF Stat LAB  10/25/18 19:10 Completed


 


 CMP [COMPREHENSIVE METABOLIC PANEL] Stat LAB  10/25/18 19:10 Completed


 


 Ertapenem Sodium [Invanz] 1 gm MEDS  10/25/18 19:52 Ordered





 0.9 % Sodium Chloride [Sodium Chloride] 50 ml   





 IV ONCE   


 


 Hydromorphone HCl [Dilaudid 1 mg/ml Syringe] MEDS  10/25/18 19:06 Discontinued





 1 mg IVP ONCE STA   


 


 Promethazine HCl [Phenergan 25 mg/ml Vial] MEDS  10/25/18 19:25 Discontinued





 25 mg .ROUTE .STK-MED ONE   


 


 Promethazine HCl [Phenergan 25 mg/ml Vial] 25 mg MEDS  10/25/18 19:05 

Discontinued





 0.9 % Sodium Chloride [Sodium Chloride] 50 ml   





 IV ONCE   


 


 CT ABDOMEN/PELVIS WO CONTRAST Stat RADS  10/25/18 19:03 Completed








Medications














Discontinued Medications














Generic Name Dose Route Start Last Admin





  Trade Name Freq  PRN Reason Stop Dose Admin


 


Hydromorphone HCl  1 mg  10/25/18 19:06  10/25/18 19:32





  Dilaudid 1 Mg/Ml Syringe  IVP  10/25/18 19:07  1 mg





  ONCE STA   Administration





     





     





     





     


 


Promethazine HCl 25 mg/ Sodium  51 mls @ 75 mls/hr  10/25/18 19:05  10/25/18 19:

33





  Chloride  IV  10/25/18 19:45  75 mls/hr





  ONCE STA   Administration





     





     





     





     











Vital Signs: 


 











  Temp Pulse Resp BP Pulse Ox


 


 10/25/18 18:14  98.7 F  82  18  143/70 H  96














Departure





- Departure


Time of Disposition: 19:55


Disposition: TSF SHORT-TRM HOSP


Discharge Problem: 


 Acute appendicitis with generalized peritonitis





Condition: Stable


Pt referred to PMD for follow-up: No


IPMP verified?: No


Allergies/Adverse Reactions: 


Allergies





animal dander Allergy (Severe, Unverified 10/25/18 18:17)


 Could not breathe and passed out


 Patient to notify drugstore 


red wasp Allergy (Severe, Uncoded 10/25/18 18:17)


 Itched all over,  passed out


 Patient to notify drugstore 








Home Medications: 


Ambulatory Orders





Clonazepam [Klonopin] 0.5 mg PO TID 01/19/18 


Tramadol HCl 50 mg PO DAILY 10/25/18 








Additional Information: 





Referred to [facility] for surgical consult and treatment by Dr Tyler


Discussed with patient who requested transfer to [facility]

## 2018-10-25 NOTE — CT
EXAM:  CT of the abdomen and pelvis without contrast. 

  

HISTORY:  Right lower quadrant abdominal pain. 

  

PROCEDURE:  Contiguous axial CT images of the abdomen and pelvis without contrast with coronal and sa
gittal reformats. 

  

FINDINGS: The liver, gallbladder, pancreas, spleen, adrenal glands and kidneys are normal in appearan
ce. The abdominal aorta is normal in appearance. There is a 1.3 cm appendicolith in the proximal appe
ndix.  The appendix is enlarged measuring 1.5 cm in diameter with trace adjacent inflammatory strandi
ng consistent with acute appendicitis.  No free fluid or free air in the abdomen or pelvis. The bladd
er is minimally filled with no abnormality identified.  The seminal vesicles and prostate gland are u
nremarkable. The bones and soft tissues are unremarkable. 

  

Impression: Acute appendicitis as described. 

  

Critical result: I personally discussed Impression #1 with the patient's ER physician on 10/25/2018 a
t 7:38 p.m.

## 2018-10-26 ENCOUNTER — ANESTHESIA (OUTPATIENT)
Dept: OPERATING ROOM | Age: 33
End: 2018-10-26
Payer: MEDICAID

## 2018-10-26 ENCOUNTER — ANESTHESIA EVENT (OUTPATIENT)
Dept: OPERATING ROOM | Age: 33
End: 2018-10-26
Payer: MEDICAID

## 2018-10-26 VITALS
BODY MASS INDEX: 35.31 KG/M2 | HEART RATE: 85 BPM | WEIGHT: 233 LBS | HEIGHT: 68 IN | TEMPERATURE: 97.9 F | RESPIRATION RATE: 18 BRPM | OXYGEN SATURATION: 95 % | SYSTOLIC BLOOD PRESSURE: 144 MMHG | DIASTOLIC BLOOD PRESSURE: 81 MMHG

## 2018-10-26 VITALS
RESPIRATION RATE: 9 BRPM | SYSTOLIC BLOOD PRESSURE: 135 MMHG | DIASTOLIC BLOOD PRESSURE: 48 MMHG | OXYGEN SATURATION: 98 %

## 2018-10-26 PROBLEM — K42.9 UMBILICAL HERNIA WITHOUT OBSTRUCTION AND WITHOUT GANGRENE: Status: ACTIVE | Noted: 2018-10-26

## 2018-10-26 PROBLEM — K37 APPENDICITIS: Status: ACTIVE | Noted: 2018-10-26

## 2018-10-26 LAB
ALBUMIN SERPL-MCNC: 3.9 G/DL (ref 3.5–5.2)
ALP BLD-CCNC: 64 U/L (ref 40–130)
ALT SERPL-CCNC: 15 U/L (ref 5–41)
ANION GAP SERPL CALCULATED.3IONS-SCNC: 15 MMOL/L (ref 7–19)
AST SERPL-CCNC: 29 U/L (ref 5–40)
BASOPHILS ABSOLUTE: 0 K/UL (ref 0–0.2)
BASOPHILS RELATIVE PERCENT: 0.2 % (ref 0–1)
BILIRUB SERPL-MCNC: 0.5 MG/DL (ref 0.2–1.2)
BUN BLDV-MCNC: 8 MG/DL (ref 6–20)
CALCIUM SERPL-MCNC: 9.1 MG/DL (ref 8.6–10)
CHLORIDE BLD-SCNC: 101 MMOL/L (ref 98–111)
CO2: 23 MMOL/L (ref 22–29)
CREAT SERPL-MCNC: 0.7 MG/DL (ref 0.5–1.2)
EOSINOPHILS ABSOLUTE: 0 K/UL (ref 0–0.6)
EOSINOPHILS RELATIVE PERCENT: 0 % (ref 0–5)
GFR NON-AFRICAN AMERICAN: >60
GLUCOSE BLD-MCNC: 119 MG/DL (ref 74–109)
HCT VFR BLD CALC: 38.1 % (ref 42–52)
HEMOGLOBIN: 12.9 G/DL (ref 14–18)
LYMPHOCYTES ABSOLUTE: 1.4 K/UL (ref 1.1–4.5)
LYMPHOCYTES RELATIVE PERCENT: 8.8 % (ref 20–40)
MCH RBC QN AUTO: 28.4 PG (ref 27–31)
MCHC RBC AUTO-ENTMCNC: 33.9 G/DL (ref 33–37)
MCV RBC AUTO: 83.7 FL (ref 80–94)
MONOCYTES ABSOLUTE: 1.8 K/UL (ref 0–0.9)
MONOCYTES RELATIVE PERCENT: 11.3 % (ref 0–10)
NEUTROPHILS ABSOLUTE: 12.4 K/UL (ref 1.5–7.5)
NEUTROPHILS RELATIVE PERCENT: 79.3 % (ref 50–65)
PDW BLD-RTO: 12.6 % (ref 11.5–14.5)
PLATELET # BLD: 267 K/UL (ref 130–400)
PMV BLD AUTO: 9 FL (ref 9.4–12.4)
POTASSIUM SERPL-SCNC: 3.4 MMOL/L (ref 3.5–5)
RBC # BLD: 4.55 M/UL (ref 4.7–6.1)
SODIUM BLD-SCNC: 139 MMOL/L (ref 136–145)
TOTAL PROTEIN: 6.9 G/DL (ref 6.6–8.7)
WBC # BLD: 15.6 K/UL (ref 4.8–10.8)

## 2018-10-26 PROCEDURE — 2500000003 HC RX 250 WO HCPCS: Performed by: SURGERY

## 2018-10-26 PROCEDURE — 36415 COLL VENOUS BLD VENIPUNCTURE: CPT

## 2018-10-26 PROCEDURE — 3700000001 HC ADD 15 MINUTES (ANESTHESIA): Performed by: SURGERY

## 2018-10-26 PROCEDURE — 96376 TX/PRO/DX INJ SAME DRUG ADON: CPT

## 2018-10-26 PROCEDURE — 2500000003 HC RX 250 WO HCPCS: Performed by: NURSE ANESTHETIST, CERTIFIED REGISTERED

## 2018-10-26 PROCEDURE — 6370000000 HC RX 637 (ALT 250 FOR IP): Performed by: SURGERY

## 2018-10-26 PROCEDURE — 44970 LAPAROSCOPY APPENDECTOMY: CPT | Performed by: SURGERY

## 2018-10-26 PROCEDURE — 2580000003 HC RX 258: Performed by: ANESTHESIOLOGY

## 2018-10-26 PROCEDURE — 7100000000 HC PACU RECOVERY - FIRST 15 MIN: Performed by: SURGERY

## 2018-10-26 PROCEDURE — 6370000000 HC RX 637 (ALT 250 FOR IP): Performed by: ANESTHESIOLOGY

## 2018-10-26 PROCEDURE — 2709999900 HC NON-CHARGEABLE SUPPLY: Performed by: SURGERY

## 2018-10-26 PROCEDURE — 85025 COMPLETE CBC W/AUTO DIFF WBC: CPT

## 2018-10-26 PROCEDURE — 96375 TX/PRO/DX INJ NEW DRUG ADDON: CPT

## 2018-10-26 PROCEDURE — 88304 TISSUE EXAM BY PATHOLOGIST: CPT

## 2018-10-26 PROCEDURE — 6360000002 HC RX W HCPCS: Performed by: SURGERY

## 2018-10-26 PROCEDURE — G0378 HOSPITAL OBSERVATION PER HR: HCPCS

## 2018-10-26 PROCEDURE — 96366 THER/PROPH/DIAG IV INF ADDON: CPT

## 2018-10-26 PROCEDURE — 3600000014 HC SURGERY LEVEL 4 ADDTL 15MIN: Performed by: SURGERY

## 2018-10-26 PROCEDURE — 99220 PR INITIAL OBSERVATION CARE/DAY 70 MINUTES: CPT | Performed by: SURGERY

## 2018-10-26 PROCEDURE — 7100000001 HC PACU RECOVERY - ADDTL 15 MIN: Performed by: SURGERY

## 2018-10-26 PROCEDURE — 2720000010 HC SURG SUPPLY STERILE: Performed by: SURGERY

## 2018-10-26 PROCEDURE — 2780000010 HC IMPLANT OTHER: Performed by: SURGERY

## 2018-10-26 PROCEDURE — 80053 COMPREHEN METABOLIC PANEL: CPT

## 2018-10-26 PROCEDURE — 3600000004 HC SURGERY LEVEL 4 BASE: Performed by: SURGERY

## 2018-10-26 PROCEDURE — 6360000002 HC RX W HCPCS: Performed by: NURSE ANESTHETIST, CERTIFIED REGISTERED

## 2018-10-26 PROCEDURE — 3700000000 HC ANESTHESIA ATTENDED CARE: Performed by: SURGERY

## 2018-10-26 RX ORDER — MEPERIDINE HYDROCHLORIDE 50 MG/ML
12.5 INJECTION INTRAMUSCULAR; INTRAVENOUS; SUBCUTANEOUS EVERY 5 MIN PRN
Status: DISCONTINUED | OUTPATIENT
Start: 2018-10-26 | End: 2018-10-26 | Stop reason: HOSPADM

## 2018-10-26 RX ORDER — KETOROLAC TROMETHAMINE 30 MG/ML
INJECTION, SOLUTION INTRAMUSCULAR; INTRAVENOUS PRN
Status: DISCONTINUED | OUTPATIENT
Start: 2018-10-26 | End: 2018-10-26 | Stop reason: SDUPTHER

## 2018-10-26 RX ORDER — FENTANYL CITRATE 50 UG/ML
50 INJECTION, SOLUTION INTRAMUSCULAR; INTRAVENOUS
Status: DISCONTINUED | OUTPATIENT
Start: 2018-10-26 | End: 2018-10-26 | Stop reason: HOSPADM

## 2018-10-26 RX ORDER — FENTANYL CITRATE 50 UG/ML
INJECTION, SOLUTION INTRAMUSCULAR; INTRAVENOUS PRN
Status: DISCONTINUED | OUTPATIENT
Start: 2018-10-26 | End: 2018-10-26 | Stop reason: SDUPTHER

## 2018-10-26 RX ORDER — HYDROCODONE BITARTRATE AND ACETAMINOPHEN 5; 325 MG/1; MG/1
1 TABLET ORAL EVERY 6 HOURS PRN
Qty: 15 TABLET | Refills: 0 | Status: SHIPPED | OUTPATIENT
Start: 2018-10-26 | End: 2018-10-26 | Stop reason: HOSPADM

## 2018-10-26 RX ORDER — MORPHINE SULFATE/0.9% NACL/PF 1 MG/ML
2 SYRINGE (ML) INJECTION EVERY 5 MIN PRN
Status: DISCONTINUED | OUTPATIENT
Start: 2018-10-26 | End: 2018-10-26 | Stop reason: HOSPADM

## 2018-10-26 RX ORDER — LABETALOL 20 MG/4 ML (5 MG/ML) INTRAVENOUS SYRINGE
5 EVERY 10 MIN PRN
Status: DISCONTINUED | OUTPATIENT
Start: 2018-10-26 | End: 2018-10-26 | Stop reason: HOSPADM

## 2018-10-26 RX ORDER — MIDAZOLAM HYDROCHLORIDE 1 MG/ML
2 INJECTION INTRAMUSCULAR; INTRAVENOUS
Status: DISCONTINUED | OUTPATIENT
Start: 2018-10-26 | End: 2018-10-26 | Stop reason: HOSPADM

## 2018-10-26 RX ORDER — SODIUM CHLORIDE 0.9 % (FLUSH) 0.9 %
10 SYRINGE (ML) INJECTION EVERY 12 HOURS SCHEDULED
Status: DISCONTINUED | OUTPATIENT
Start: 2018-10-26 | End: 2018-10-26 | Stop reason: HOSPADM

## 2018-10-26 RX ORDER — LIDOCAINE HYDROCHLORIDE 10 MG/ML
INJECTION, SOLUTION EPIDURAL; INFILTRATION; INTRACAUDAL; PERINEURAL PRN
Status: DISCONTINUED | OUTPATIENT
Start: 2018-10-26 | End: 2018-10-26 | Stop reason: SDUPTHER

## 2018-10-26 RX ORDER — MIDAZOLAM HYDROCHLORIDE 1 MG/ML
INJECTION INTRAMUSCULAR; INTRAVENOUS PRN
Status: DISCONTINUED | OUTPATIENT
Start: 2018-10-26 | End: 2018-10-26 | Stop reason: SDUPTHER

## 2018-10-26 RX ORDER — SODIUM CHLORIDE 0.9 % (FLUSH) 0.9 %
10 SYRINGE (ML) INJECTION PRN
Status: DISCONTINUED | OUTPATIENT
Start: 2018-10-26 | End: 2018-10-26 | Stop reason: HOSPADM

## 2018-10-26 RX ORDER — ROCURONIUM BROMIDE 10 MG/ML
INJECTION, SOLUTION INTRAVENOUS PRN
Status: DISCONTINUED | OUTPATIENT
Start: 2018-10-26 | End: 2018-10-26 | Stop reason: SDUPTHER

## 2018-10-26 RX ORDER — DIPHENHYDRAMINE HYDROCHLORIDE 50 MG/ML
12.5 INJECTION INTRAMUSCULAR; INTRAVENOUS
Status: DISCONTINUED | OUTPATIENT
Start: 2018-10-26 | End: 2018-10-26 | Stop reason: HOSPADM

## 2018-10-26 RX ORDER — SODIUM CHLORIDE, SODIUM LACTATE, POTASSIUM CHLORIDE, CALCIUM CHLORIDE 600; 310; 30; 20 MG/100ML; MG/100ML; MG/100ML; MG/100ML
INJECTION, SOLUTION INTRAVENOUS CONTINUOUS
Status: DISCONTINUED | OUTPATIENT
Start: 2018-10-26 | End: 2018-10-26 | Stop reason: HOSPADM

## 2018-10-26 RX ORDER — PROMETHAZINE HYDROCHLORIDE 25 MG/ML
6.25 INJECTION, SOLUTION INTRAMUSCULAR; INTRAVENOUS
Status: DISCONTINUED | OUTPATIENT
Start: 2018-10-26 | End: 2018-10-26 | Stop reason: HOSPADM

## 2018-10-26 RX ORDER — OXYCODONE HYDROCHLORIDE AND ACETAMINOPHEN 5; 325 MG/1; MG/1
1 TABLET ORAL EVERY 6 HOURS PRN
Status: DISCONTINUED | OUTPATIENT
Start: 2018-10-26 | End: 2018-10-26 | Stop reason: HOSPADM

## 2018-10-26 RX ORDER — OXYCODONE HYDROCHLORIDE AND ACETAMINOPHEN 5; 325 MG/1; MG/1
1 TABLET ORAL EVERY 6 HOURS PRN
Qty: 15 TABLET | Refills: 0 | Status: SHIPPED | OUTPATIENT
Start: 2018-10-26 | End: 2018-11-02

## 2018-10-26 RX ORDER — DEXAMETHASONE SODIUM PHOSPHATE 10 MG/ML
INJECTION INTRAMUSCULAR; INTRAVENOUS PRN
Status: DISCONTINUED | OUTPATIENT
Start: 2018-10-26 | End: 2018-10-26 | Stop reason: SDUPTHER

## 2018-10-26 RX ORDER — LIDOCAINE HYDROCHLORIDE 10 MG/ML
1 INJECTION, SOLUTION EPIDURAL; INFILTRATION; INTRACAUDAL; PERINEURAL
Status: DISCONTINUED | OUTPATIENT
Start: 2018-10-26 | End: 2018-10-26 | Stop reason: HOSPADM

## 2018-10-26 RX ORDER — FENTANYL CITRATE 50 UG/ML
25 INJECTION, SOLUTION INTRAMUSCULAR; INTRAVENOUS
Status: DISCONTINUED | OUTPATIENT
Start: 2018-10-26 | End: 2018-10-26 | Stop reason: HOSPADM

## 2018-10-26 RX ORDER — HYDRALAZINE HYDROCHLORIDE 20 MG/ML
5 INJECTION INTRAMUSCULAR; INTRAVENOUS EVERY 10 MIN PRN
Status: DISCONTINUED | OUTPATIENT
Start: 2018-10-26 | End: 2018-10-26 | Stop reason: HOSPADM

## 2018-10-26 RX ORDER — KETAMINE HYDROCHLORIDE 100 MG/ML
INJECTION, SOLUTION INTRAMUSCULAR; INTRAVENOUS PRN
Status: DISCONTINUED | OUTPATIENT
Start: 2018-10-26 | End: 2018-10-26 | Stop reason: SDUPTHER

## 2018-10-26 RX ORDER — ONDANSETRON 2 MG/ML
INJECTION INTRAMUSCULAR; INTRAVENOUS PRN
Status: DISCONTINUED | OUTPATIENT
Start: 2018-10-26 | End: 2018-10-26 | Stop reason: SDUPTHER

## 2018-10-26 RX ORDER — SCOLOPAMINE TRANSDERMAL SYSTEM 1 MG/1
1 PATCH, EXTENDED RELEASE TRANSDERMAL
Status: DISCONTINUED | OUTPATIENT
Start: 2018-10-26 | End: 2018-10-26 | Stop reason: HOSPADM

## 2018-10-26 RX ORDER — PROPOFOL 10 MG/ML
INJECTION, EMULSION INTRAVENOUS PRN
Status: DISCONTINUED | OUTPATIENT
Start: 2018-10-26 | End: 2018-10-26 | Stop reason: SDUPTHER

## 2018-10-26 RX ORDER — METOCLOPRAMIDE HYDROCHLORIDE 5 MG/ML
10 INJECTION INTRAMUSCULAR; INTRAVENOUS
Status: DISCONTINUED | OUTPATIENT
Start: 2018-10-26 | End: 2018-10-26 | Stop reason: HOSPADM

## 2018-10-26 RX ORDER — MORPHINE SULFATE/0.9% NACL/PF 1 MG/ML
4 SYRINGE (ML) INJECTION
Status: DISCONTINUED | OUTPATIENT
Start: 2018-10-26 | End: 2018-10-26 | Stop reason: HOSPADM

## 2018-10-26 RX ORDER — MORPHINE SULFATE/0.9% NACL/PF 1 MG/ML
4 SYRINGE (ML) INJECTION EVERY 5 MIN PRN
Status: DISCONTINUED | OUTPATIENT
Start: 2018-10-26 | End: 2018-10-26 | Stop reason: HOSPADM

## 2018-10-26 RX ORDER — ENALAPRILAT 2.5 MG/2ML
1.25 INJECTION INTRAVENOUS
Status: DISCONTINUED | OUTPATIENT
Start: 2018-10-26 | End: 2018-10-26 | Stop reason: HOSPADM

## 2018-10-26 RX ADMIN — SODIUM CHLORIDE, SODIUM LACTATE, POTASSIUM CHLORIDE, AND CALCIUM CHLORIDE: 600; 310; 30; 20 INJECTION, SOLUTION INTRAVENOUS at 12:09

## 2018-10-26 RX ADMIN — SODIUM CHLORIDE, SODIUM LACTATE, POTASSIUM CHLORIDE, AND CALCIUM CHLORIDE: 600; 310; 30; 20 INJECTION, SOLUTION INTRAVENOUS at 11:09

## 2018-10-26 RX ADMIN — Medication 30 MG: at 11:28

## 2018-10-26 RX ADMIN — FENTANYL CITRATE 100 MCG: 50 INJECTION INTRAMUSCULAR; INTRAVENOUS at 11:23

## 2018-10-26 RX ADMIN — FENTANYL CITRATE 50 MCG: 50 INJECTION INTRAMUSCULAR; INTRAVENOUS at 12:29

## 2018-10-26 RX ADMIN — ROCURONIUM BROMIDE 50 MG: 10 INJECTION INTRAVENOUS at 11:23

## 2018-10-26 RX ADMIN — Medication 4 MG: at 10:30

## 2018-10-26 RX ADMIN — Medication 2 MG: at 06:13

## 2018-10-26 RX ADMIN — Medication 4 MG: at 09:03

## 2018-10-26 RX ADMIN — Medication 2 MG: at 08:00

## 2018-10-26 RX ADMIN — Medication 2 MG: at 02:55

## 2018-10-26 RX ADMIN — KETOROLAC TROMETHAMINE 30 MG: 30 INJECTION, SOLUTION INTRAMUSCULAR; INTRAVENOUS at 11:29

## 2018-10-26 RX ADMIN — METRONIDAZOLE 500 MG: 500 INJECTION, SOLUTION INTRAVENOUS at 06:15

## 2018-10-26 RX ADMIN — HYDROMORPHONE HYDROCHLORIDE 0.5 MG: 1 INJECTION, SOLUTION INTRAMUSCULAR; INTRAVENOUS; SUBCUTANEOUS at 12:35

## 2018-10-26 RX ADMIN — MIDAZOLAM 2 MG: 1 INJECTION INTRAMUSCULAR; INTRAVENOUS at 11:18

## 2018-10-26 RX ADMIN — OXYCODONE AND ACETAMINOPHEN 1 TABLET: 5; 325 TABLET ORAL at 15:08

## 2018-10-26 RX ADMIN — FENTANYL CITRATE 50 MCG: 50 INJECTION INTRAMUSCULAR; INTRAVENOUS at 12:15

## 2018-10-26 RX ADMIN — HYDROMORPHONE HYDROCHLORIDE 0.5 MG: 1 INJECTION, SOLUTION INTRAMUSCULAR; INTRAVENOUS; SUBCUTANEOUS at 12:25

## 2018-10-26 RX ADMIN — ONDANSETRON HYDROCHLORIDE 4 MG: 2 INJECTION, SOLUTION INTRAMUSCULAR; INTRAVENOUS at 02:55

## 2018-10-26 RX ADMIN — LIDOCAINE HYDROCHLORIDE 50 MG: 10 INJECTION, SOLUTION EPIDURAL; INFILTRATION; INTRACAUDAL; PERINEURAL at 11:22

## 2018-10-26 RX ADMIN — DEXAMETHASONE SODIUM PHOSPHATE 10 MG: 10 INJECTION INTRAMUSCULAR; INTRAVENOUS at 11:29

## 2018-10-26 RX ADMIN — HYDROMORPHONE HYDROCHLORIDE 0.5 MG: 1 INJECTION, SOLUTION INTRAMUSCULAR; INTRAVENOUS; SUBCUTANEOUS at 12:21

## 2018-10-26 RX ADMIN — PROPOFOL 200 MG: 10 INJECTION, EMULSION INTRAVENOUS at 11:22

## 2018-10-26 RX ADMIN — SUGAMMADEX 220 MG: 100 INJECTION, SOLUTION INTRAVENOUS at 12:31

## 2018-10-26 RX ADMIN — ONDANSETRON HYDROCHLORIDE 4 MG: 2 INJECTION, SOLUTION INTRAMUSCULAR; INTRAVENOUS at 11:29

## 2018-10-26 RX ADMIN — SODIUM CHLORIDE, SODIUM LACTATE, POTASSIUM CHLORIDE, AND CALCIUM CHLORIDE: 600; 310; 30; 20 INJECTION, SOLUTION INTRAVENOUS at 11:20

## 2018-10-26 RX ADMIN — METRONIDAZOLE 500 MG: 500 INJECTION, SOLUTION INTRAVENOUS at 11:54

## 2018-10-26 ASSESSMENT — PAIN SCALES - GENERAL
PAINLEVEL_OUTOF10: 10
PAINLEVEL_OUTOF10: 0
PAINLEVEL_OUTOF10: 5
PAINLEVEL_OUTOF10: 10
PAINLEVEL_OUTOF10: 9
PAINLEVEL_OUTOF10: 10

## 2018-10-26 ASSESSMENT — LIFESTYLE VARIABLES: SMOKING_STATUS: 0

## 2018-10-26 NOTE — BRIEF OP NOTE
Brief Postoperative Note      DATE OF PROCEDURE: 10/26/2018     SURGEON: Елена Haq MD    PREOPERATIVE DIAGNOSIS: APPENDICTIS     POSTOPERATIVE DIAGNOSIS: Same     OPERATION: Procedure(s):  APPENDECTOMY LAPAROSCOPIC    ANESTHESIA: General    ESTIMATED BLOOD LOSS: Minimal    COMPLICATIONS: None. SPECIMENS:   ID Type Source Tests Collected by Time Destination   A :  Tissue Appendix Breana Carlson 90MD Kathleen 10/26/2018 1156        DRAINS: None    The patient tolerated the procedure well.     Electronically signed by Елена Haq MD  on 10/26/2018 at 12:46 PM

## 2018-10-26 NOTE — PROGRESS NOTES
Sherwin Vázquez arrived to room # 512. Presented with: Appendicitis   Mental Status: Patient is oriented, alert and coherent. Vitals:    10/26/18 0020   BP: (!) 144/79   Pulse: 87   Resp: 16   Temp: 100.7 °F (38.2 °C)   SpO2: 94%     Patient safety contract and falls prevention contract reviewed with patient Yes. Oriented Patient and Family to room. Call light within reach. Yes.   Needs, issues or concerns expressed at this time: no.      Electronically signed by Ro Alicea RN on 10/26/2018 at 1:36 AM

## 2018-10-26 NOTE — PROGRESS NOTES
4 Eyes Skin Assessment    Larry Pennington is being assessed upon: Admission    I agree that I, Dong Rodrigues, along with Nikhil Mehta RN (either 2 RN's or 1 LPN and 1 RN) have performed a thorough Head to Toe Skin Assessment on the patient. ALL assessment sites listed below have been assessed. Areas assessed by both nurses:     [x]   Head, Face, and Ears   [x]   Shoulders, Back, and Chest  [x]   Arms, Elbows, and Hands   [x]   Coccyx, Sacrum, and Ischium  [x]   Legs, Feet, and Heels    Does the Patient have Skin Breakdown?  No    Samir Prevention initiated: NA  Wound Care Orders initiated: NA    [facility] nurse consulted for Pressure Injury (Stage 3,4, Unstageable, DTI, NWPT, and Complex wounds) and New or Established Ostomies: NA        Primary Nurse eSignature: Dong Rodrigues RN on 10/26/2018 at 1:42 AM      Co-Signer eSignature: Electronically signed by Fe Fernandez RN on 10/26/18 at 1:43 AM

## 2018-10-26 NOTE — PLAN OF CARE
Problem: Nutrition  Goal: Optimal nutrition therapy  Nutrition Problem: Inadequate oral intake  Intervention: Food and/or Nutrient Delivery: Continue NPO  Nutritional Goals: Pt will progress to an oral diet with no s/s intolerance s/p surgery    Outcome: Ongoing

## 2018-10-26 NOTE — H&P
Chief complaint: Abdominal pain    History of Present Illness: The patient is a 13-year-old gentleman who on the night before last developed crampy mid abdominal pain. There was mild nausea and no vomiting. It got worse over the day yesterday and yesterday evening he presented to the [facility] emergency room. CT there revealed acute appendicitis and he was transferred here for definitive therapy. Svetlana Momin is a 28 y.o. male with the [facility]: There are no active problems to display for this patient. Current Facility-Administered Medications   Medication Dose Route Frequency Provider Last Rate Last Dose    morphine injection 4 mg  4 mg Intravenous Q2H PRN Elaina Braun MD   4 mg at 10/26/18 7064    metronidazole (FLAGYL) 500 mg in NaCl 100 mL IVPB premix  500 mg Intravenous Q8H Elaina Braun MD   Stopped at 10/26/18 0715    ondansetron (ZOFRAN) injection 4 mg  4 mg Intravenous Q4H PRN Elaina Braun MD   4 mg at 10/26/18 0255    dextrose 5 % in lactated ringers infusion   Intravenous Continuous Elaina Braun  mL/hr at 10/25/18 9024       Allergies: Patient has no known allergies.   Past Medical History:   Diagnosis Date    Anxiety     Cerebral artery occlusion with cerebral infarction [facility]) 2009    20 yo     Depression     Hx of blood clots     Hypertension     MRSA (methicillin resistant staph aureus) culture positive     started in arm-went into blood stream      Past Surgical History:   Procedure Laterality Date    ARM SURGERY Right     BACK SURGERY      vertabre cut out of lower back     KNEE SURGERY Bilateral      Family History   Problem Relation Age of Onset    No Known Problems Mother     No Known Problems Father      Social History   Substance Use Topics    Smoking status: Never Smoker    Smokeless tobacco: Former User     Types: Chew     Quit date: 5/1/2018    Alcohol use Yes      Comment: occ       ROS:  14 point review of systems is negative except for the above. PHYSICAL EXAM:    The patient is a 28 y.o. male  in no acute distress. He is alert oriented and cooperative. HEENT: Normocephalic and atraumatic. EOMs intact. Pupils equal and round and reactive to light and accommodation. Sclere nonicteric. Oropharynx without masses or lesions. Neck: Neck is supple without masses or thyromegaly    Chest: Lungs are clear to auscultation    Cardiac: Regular rate and rhythm without rubs, murmurs, or gallops    Abdomen: The abdomen is soft with guarding and rebound in the right lower quadrant. Extremities: The extremities are normal. There are no signs of clubbing, cyanosis, or edema. IMPRESSION: Acute appendicitis    PLAN: Laparoscopic appendectomy    We discussed the risks and benefits of surgery with him and he understands and is agreeable to proceed.

## 2018-10-26 NOTE — PLAN OF CARE
Problem: Falls - Risk of:  Goal: Will remain free from falls  Will remain free from falls   Outcome: Ongoing    Goal: Absence of physical injury  Absence of physical injury   Outcome: Ongoing      Problem: Nutrition  Goal: Optimal nutrition therapy  Nutrition Problem: Inadequate oral intake  Intervention: Food and/or Nutrient Delivery: Continue NPO  Nutritional Goals: Pt will progress to an oral diet with no s/s intolerance s/p surgery     Outcome: Ongoing

## 2018-10-26 NOTE — PROGRESS NOTES
Consent signed and placed in chart for laproscopic appendectomy.  Electronically signed by Tanja Guadalupe RN on 10/26/2018 at 8:34 AM

## 2018-10-26 NOTE — PROGRESS NOTES
Patient being transported to Surgery.  Electronically signed by Silas Zelaya RN on 10/26/2018 at 10:54 AM

## 2018-10-26 NOTE — ANESTHESIA PRE PROCEDURE
Department of Anesthesiology  Preprocedure Note       Name:  Salena Knox   Age:  28 y.o.  :  1985                                          MRN:  [id number]         Date:  10/26/2018      Surgeon: Damaris Bhatia):  Marc Bowie MD    Procedure: APPENDECTOMY LAPAROSCOPIC (N/A )    Medications prior to admission:   Prior to Admission medications    Medication Sig Start Date End Date Taking? Authorizing Provider   BuPROPion HCl (FORFIVO XL PO) Take 450 mg by mouth daily   Yes Historical Provider, MD   traMADol (ULTRAM) 50 MG tablet Take 50 mg by mouth every 6 hours as needed for Pain (1-2 pills every 6-8 hours). .   Yes Historical Provider, MD   clonazePAM (KLONOPIN) 0.5 MG disintegrating tablet Take 1 tablet by mouth 4 times daily for 30 days. .  Patient taking differently: Take 0.5 mg by mouth 3 times daily. . 10/19/18 11/18/18 Yes STACIA Swanson   risperiDONE (RISPERDAL) 1 MG tablet Take 1 tablet by mouth 2 times daily 10/16/18  Yes STACIA Swanson   lamoTRIgine (LAMICTAL) 100 MG tablet Take 1 tablet by mouth daily 18  Yes STACIA Swanson   gabapentin (NEURONTIN) 100 MG capsule Take 100 mg by mouth 3 times daily. .   Yes Historical Provider, MD   meloxicam (MOBIC) 7.5 MG tablet Take 7.5 mg by mouth 2 times daily    Yes Historical Provider, MD   metoprolol tartrate (LOPRESSOR) 25 MG tablet Take 25 mg by mouth 2 times daily   Yes Historical Provider, MD   lisinopril (PRINIVIL;ZESTRIL) 20 MG tablet Take 20 mg by mouth daily   Yes Historical Provider, MD       Current medications:    Current Facility-Administered Medications   Medication Dose Route Frequency Provider Last Rate Last Dose    morphine injection 4 mg  4 mg Intravenous Q2H PRN Marc Bowie MD   4 mg at 10/26/18 1030    metronidazole (FLAGYL) 500 mg in NaCl 100 mL IVPB premix  500 mg Intravenous Myles Ward MD   Stopped at 10/26/18 0715    ondansetron (ZOFRAN) injection 4 mg  4 mg Intravenous Q4H

## 2018-10-31 ENCOUNTER — TELEPHONE (OUTPATIENT)
Dept: SURGERY | Age: 33
End: 2018-10-31

## 2018-11-19 DIAGNOSIS — F41.9 ANXIETY: ICD-10-CM

## 2018-11-19 RX ORDER — CLONAZEPAM 0.5 MG/1
0.5 TABLET, ORALLY DISINTEGRATING ORAL 4 TIMES DAILY
Qty: 60 TABLET | Refills: 0 | Status: SHIPPED | OUTPATIENT
Start: 2018-11-19 | End: 2018-12-19

## 2018-11-20 NOTE — TELEPHONE ENCOUNTER
Called Rx into pharmacy.  Spoke with pharmacist.
effects of the medications have been discussed. Yes.  2. The pt has verbalized understanding and has capacity to give informed consent. 3. The Finesse Gagnon report has been reviewed according to [facility] regulations. 4. Supportive therapy offered. 5. Follow up: No Follow-up on file. 6. The patient has been advised to call with any problems. 7. Controlled substance Treatment Plan: this is a maintenance dose. .  8. The above listed medications have been continued, modifications in meds and other orders/labs as follows: No orders of the defined types were placed in this encounter. No orders of the defined types were placed in this encounter.       9. Additional comments:      Cheikh Guerrero, AMALIAP-BC

## 2018-11-26 ENCOUNTER — OFFICE VISIT (OUTPATIENT)
Dept: SURGERY | Age: 33
End: 2018-11-26

## 2018-11-26 VITALS
WEIGHT: 238.8 LBS | HEIGHT: 68 IN | DIASTOLIC BLOOD PRESSURE: 78 MMHG | SYSTOLIC BLOOD PRESSURE: 124 MMHG | TEMPERATURE: 98 F | BODY MASS INDEX: 36.19 KG/M2

## 2018-11-26 DIAGNOSIS — Z90.49 STATUS POST LAPAROSCOPIC APPENDECTOMY: Primary | ICD-10-CM

## 2018-11-26 PROCEDURE — 99024 POSTOP FOLLOW-UP VISIT: CPT | Performed by: PHYSICIAN ASSISTANT

## 2018-12-03 ENCOUNTER — TELEPHONE (OUTPATIENT)
Dept: PSYCHIATRY | Age: 33
End: 2018-12-03

## 2018-12-04 NOTE — TELEPHONE ENCOUNTER
Please let patient know that when he finds a practice he would like to attend, I will gladly send in a referral. Thanks.

## 2019-01-14 ENCOUNTER — HOSPITAL ENCOUNTER (OUTPATIENT)
Dept: HOSPITAL 58 - LAB | Age: 34
Discharge: HOME | End: 2019-01-14
Attending: PHYSICIAN ASSISTANT

## 2019-01-14 VITALS — BODY MASS INDEX: 38.1 KG/M2

## 2019-01-14 DIAGNOSIS — R53.83: Primary | ICD-10-CM

## 2019-01-14 PROCEDURE — 36415 COLL VENOUS BLD VENIPUNCTURE: CPT

## 2019-01-14 PROCEDURE — 84403 ASSAY OF TOTAL TESTOSTERONE: CPT

## 2019-01-22 ENCOUNTER — HOSPITAL ENCOUNTER (OUTPATIENT)
Dept: HOSPITAL 58 - LAB | Age: 34
Discharge: HOME | End: 2019-01-22
Attending: ANESTHESIOLOGY

## 2019-01-22 VITALS — BODY MASS INDEX: 38.1 KG/M2

## 2019-01-22 DIAGNOSIS — Z01.818: Primary | ICD-10-CM

## 2019-01-22 DIAGNOSIS — I10: ICD-10-CM

## 2019-01-22 DIAGNOSIS — S83.272A: ICD-10-CM

## 2019-01-22 PROCEDURE — 80048 BASIC METABOLIC PNL TOTAL CA: CPT

## 2019-01-22 PROCEDURE — 36415 COLL VENOUS BLD VENIPUNCTURE: CPT

## 2019-09-03 ENCOUNTER — HOSPITAL ENCOUNTER (OUTPATIENT)
Dept: HOSPITAL 58 - RAD | Age: 34
Discharge: HOME | End: 2019-09-03
Attending: PHYSICIAN ASSISTANT

## 2019-09-03 VITALS — BODY MASS INDEX: 38.1 KG/M2

## 2019-09-03 DIAGNOSIS — M54.5: Primary | ICD-10-CM

## 2019-09-03 NOTE — DI
EXAM:  Three views of the lumbar spine. 

  

INDICATION:  Low back pain. 

  

COMPARISON: 05/22/2018. 

  

FINDINGS/IMPRESSION: 

  

There are five lumbar type vertebral bodies.  Spinal Alignment is preserved.  No significant listhesi
s. 

Vertebral body heights are preserved.  No acute fracture. 

No significant degnerative changes.

## 2020-10-06 ENCOUNTER — TRANSCRIBE ORDERS (OUTPATIENT)
Dept: ADMINISTRATIVE | Facility: HOSPITAL | Age: 35
End: 2020-10-06

## 2020-10-06 DIAGNOSIS — R25.2 CRAMP AND SPASM: Primary | ICD-10-CM

## 2020-10-20 ENCOUNTER — APPOINTMENT (OUTPATIENT)
Dept: NEUROLOGY | Facility: HOSPITAL | Age: 35
End: 2020-10-20

## 2020-11-11 ENCOUNTER — HOSPITAL ENCOUNTER (OUTPATIENT)
Dept: NEUROLOGY | Facility: HOSPITAL | Age: 35
Discharge: HOME | End: 2020-11-11
Admitting: NURSE PRACTITIONER

## 2020-11-11 DIAGNOSIS — R25.2 CRAMP AND SPASM: ICD-10-CM

## 2020-11-11 PROCEDURE — 95886 MUSC TEST DONE W/N TEST COMP: CPT

## 2020-11-11 PROCEDURE — 95909 NRV CNDJ TST 5-6 STUDIES: CPT

## 2020-11-16 ENCOUNTER — HOSPITAL ENCOUNTER (OUTPATIENT)
Dept: NEUROLOGY | Facility: HOSPITAL | Age: 35
End: 2020-11-16

## 2021-04-23 ENCOUNTER — TELEPHONE (OUTPATIENT)
Dept: NEUROSURGERY | Facility: CLINIC | Age: 36
End: 2021-04-23

## 2021-04-23 NOTE — TELEPHONE ENCOUNTER
LEFT A VM FOR PATIENT TO CALL BACK TO RESCHEDULE HIS NOT SHOW APPT WITH [facility]. I WILL ALSO MAIL A NO SHOW LETTER TO ADDRESS LISTED IN THE CHART.

## 2021-05-12 ENCOUNTER — TELEPHONE (OUTPATIENT)
Dept: NEUROSURGERY | Facility: CLINIC | Age: 36
End: 2021-05-12

## 2021-05-12 NOTE — TELEPHONE ENCOUNTER
CALLED TO CONFIRM APT WITH [facility] FOR 05/13/2021 @ 9:30    CALLED CELL--LEFT V/M FOR CALL BACK    CALLED HOME--NUMBER NOT IN SERVICE

## 2021-06-02 ENCOUNTER — TELEPHONE (OUTPATIENT)
Dept: NEUROSURGERY | Facility: CLINIC | Age: 36
End: 2021-06-02

## 2021-06-02 NOTE — TELEPHONE ENCOUNTER
PT CONFIRMED APT WITH [facility] FOR 06/03/2021 @ 1:45    PT REMINDED TO BRING IMAGING DISC AND COMPLETED PW

## 2021-06-03 ENCOUNTER — OFFICE VISIT (OUTPATIENT)
Dept: NEUROSURGERY | Facility: CLINIC | Age: 36
End: 2021-06-03

## 2021-06-03 VITALS — BODY MASS INDEX: 41.52 KG/M2 | WEIGHT: 274 LBS | HEIGHT: 68 IN

## 2021-06-03 DIAGNOSIS — R20.0 NUMBNESS AND TINGLING OF RIGHT LOWER EXTREMITY: ICD-10-CM

## 2021-06-03 DIAGNOSIS — R29.898 WEAKNESS OF RIGHT LOWER EXTREMITY: ICD-10-CM

## 2021-06-03 DIAGNOSIS — M54.50 LUMBAR BACK PAIN: Primary | ICD-10-CM

## 2021-06-03 DIAGNOSIS — M79.604 PAIN OF RIGHT LOWER EXTREMITY: ICD-10-CM

## 2021-06-03 DIAGNOSIS — R20.2 NUMBNESS AND TINGLING OF RIGHT LOWER EXTREMITY: ICD-10-CM

## 2021-06-03 DIAGNOSIS — E66.01 CLASS 3 SEVERE OBESITY DUE TO EXCESS CALORIES WITH BODY MASS INDEX (BMI) OF 40.0 TO 44.9 IN ADULT, UNSPECIFIED WHETHER SERIOUS COMORBIDITY PRESENT (HCC): ICD-10-CM

## 2021-06-03 DIAGNOSIS — Z72.0 CHEWS TOBACCO: ICD-10-CM

## 2021-06-03 PROBLEM — E66.813 CLASS 3 SEVERE OBESITY DUE TO EXCESS CALORIES WITH BODY MASS INDEX (BMI) OF 40.0 TO 44.9 IN ADULT: Status: ACTIVE | Noted: 2021-06-03

## 2021-06-03 PROCEDURE — 99204 OFFICE O/P NEW MOD 45 MIN: CPT | Performed by: NURSE PRACTITIONER

## 2021-06-03 RX ORDER — LISINOPRIL 40 MG/1
TABLET ORAL
COMMUNITY

## 2021-06-03 RX ORDER — PREGABALIN 150 MG/1
CAPSULE ORAL
COMMUNITY
Start: 2021-05-03

## 2021-06-03 RX ORDER — TIZANIDINE 4 MG/1
TABLET ORAL
COMMUNITY

## 2021-06-03 RX ORDER — SILDENAFIL 100 MG/1
TABLET, FILM COATED ORAL
COMMUNITY

## 2021-06-03 RX ORDER — ANASTROZOLE 1 MG/1
TABLET ORAL
COMMUNITY

## 2021-06-03 RX ORDER — HYDROCODONE BITARTRATE AND ACETAMINOPHEN 5; 325 MG/1; MG/1
TABLET ORAL
COMMUNITY

## 2021-06-03 RX ORDER — HYDROCHLOROTHIAZIDE 12.5 MG/1
CAPSULE, GELATIN COATED ORAL
COMMUNITY

## 2021-06-03 RX ORDER — QUETIAPINE FUMARATE 25 MG/1
TABLET, FILM COATED ORAL
COMMUNITY

## 2021-06-03 RX ORDER — TESTOSTERONE CYPIONATE 200 MG/ML
0.75 INJECTION, SOLUTION INTRAMUSCULAR
COMMUNITY

## 2021-06-03 NOTE — PATIENT INSTRUCTIONS
"https://www.nhlbi.nih.gov/files/docs/public/heart/dash_brief.pdf\">   DASH Eating Plan  DASH stands for Dietary Approaches to Stop Hypertension. The DASH eating plan is a healthy eating plan that has been shown to:  · Reduce high blood pressure (hypertension).  · Reduce your risk for type 2 diabetes, heart disease, and stroke.  · Help with weight loss.  What are tips for following this plan?  Reading food labels  · Check food labels for the amount of salt (sodium) per serving. Choose foods with less than 5 percent of the Daily Value of sodium. Generally, foods with less than 300 milligrams (mg) of sodium per serving fit into this eating plan.  · To find whole grains, look for the word \"whole\" as the first word in the ingredient list.  Shopping  · Buy products labeled as \"low-sodium\" or \"no salt added.\"  · Buy fresh foods. Avoid canned foods and pre-made or frozen meals.  Cooking  · Avoid adding salt when cooking. Use salt-free seasonings or herbs instead of table salt or sea salt. Check with your health care provider or pharmacist before using salt substitutes.  · Do not avalos foods. Cook foods using healthy methods such as baking, boiling, grilling, roasting, and broiling instead.  · Cook with heart-healthy oils, such as olive, canola, avocado, soybean, or sunflower oil.  Meal planning    · Eat a balanced diet that includes:  ? 4 or more servings of fruits and 4 or more servings of vegetables each day. Try to fill one-half of your plate with fruits and vegetables.  ? 6-8 servings of whole grains each day.  ? Less than 6 oz (170 g) of lean meat, poultry, or fish each day. A 3-oz (85-g) serving of meat is about the same size as a deck of cards. One egg equals 1 oz (28 g).  ? 2-3 servings of low-fat dairy each day. One serving is 1 cup (237 mL).  ? 1 serving of nuts, seeds, or beans 5 times each week.  ? 2-3 servings of heart-healthy fats. Healthy fats called omega-3 fatty acids are found in foods such as walnuts, " flaxseeds, fortified milks, and eggs. These fats are also found in cold-water fish, such as sardines, salmon, and mackerel.  · Limit how much you eat of:  ? Canned or prepackaged foods.  ? Food that is high in trans fat, such as some fried foods.  ? Food that is high in saturated fat, such as fatty meat.  ? Desserts and other sweets, sugary drinks, and other foods with added sugar.  ? Full-fat dairy products.  · Do not salt foods before eating.  · Do not eat more than 4 egg yolks a week.  · Try to eat at least 2 vegetarian meals a week.  · Eat more home-cooked food and less restaurant, buffet, and fast food.  Lifestyle  · When eating at a restaurant, ask that your food be prepared with less salt or no salt, if possible.  · If you drink alcohol:  ? Limit how much you use to:  § 0-1 drink a day for women who are not pregnant.  § 0-2 drinks a day for men.  ? Be aware of how much alcohol is in your drink. In the [place] one drink equals one 12 oz bottle of beer (355 mL), one 5 oz glass of wine (148 mL), or one 1½ oz glass of hard liquor (44 mL).  General information  · Avoid eating more than 2,300 mg of salt a day. If you have hypertension, you may need to reduce your sodium intake to 1,500 mg a day.  · Work with your health care provider to maintain a healthy body weight or to lose weight. Ask what an ideal weight is for you.  · Get at least 30 minutes of exercise that causes your heart to beat faster (aerobic exercise) most days of the week. Activities may include walking, swimming, or biking.  · Work with your health care provider or dietitian to adjust your eating plan to your individual calorie needs.  What foods should I eat?  Fruits  All fresh, dried, or frozen fruit. Canned fruit in natural juice (without added sugar).  Vegetables  Fresh or frozen vegetables (raw, steamed, roasted, or grilled). Low-sodium or reduced-sodium tomato and vegetable juice. Low-sodium or reduced-sodium tomato sauce and tomato paste.  Low-sodium or reduced-sodium canned vegetables.  Grains  Whole-grain or whole-wheat bread. Whole-grain or whole-wheat pasta. Brown rice. Oatmeal. Quinoa. Bulgur. Whole-grain and low-sodium cereals. Yadira bread. Low-fat, low-sodium crackers. Whole-wheat flour tortillas.  Meats and other proteins  Skinless chicken or turkey. Ground chicken or turkey. Pork with fat trimmed off. Fish and seafood. Egg whites. Dried beans, peas, or lentils. Unsalted nuts, nut butters, and seeds. Unsalted canned beans. Lean cuts of beef with fat trimmed off. Low-sodium, lean precooked or cured meat, such as sausages or meat loaves.  Dairy  Low-fat (1%) or fat-free (skim) milk. Reduced-fat, low-fat, or fat-free cheeses. Nonfat, low-sodium ricotta or cottage cheese. Low-fat or nonfat yogurt. Low-fat, low-sodium cheese.  Fats and oils  Soft margarine without trans fats. Vegetable oil. Reduced-fat, low-fat, or light mayonnaise and salad dressings (reduced-sodium). Canola, safflower, olive, avocado, soybean, and sunflower oils. Avocado.  Seasonings and condiments  Herbs. Spices. Seasoning mixes without salt.  Other foods  Unsalted popcorn and pretzels. Fat-free sweets.  The items listed above may not be a complete list of foods and beverages you can eat. Contact a dietitian for more information.  What foods should I avoid?  Fruits  Canned fruit in a light or heavy syrup. Fried fruit. Fruit in cream or butter sauce.  Vegetables  Creamed or fried vegetables. Vegetables in a cheese sauce. Regular canned vegetables (not low-sodium or reduced-sodium). Regular canned tomato sauce and paste (not low-sodium or reduced-sodium). Regular tomato and vegetable juice (not low-sodium or reduced-sodium). Pickles. Olives.  Grains  Baked goods made with fat, such as croissants, muffins, or some breads. Dry pasta or rice meal packs.  Meats and other proteins  Fatty cuts of meat. Ribs. Fried meat. Ortez. Bologna, salami, and other precooked or cured meats, such as  sausages or meat loaves. Fat from the back of a pig (fatback). Bratwurst. Salted nuts and seeds. Canned beans with added salt. Canned or smoked fish. Whole eggs or egg yolks. Chicken or turkey with skin.  Dairy  Whole or 2% milk, cream, and half-and-half. Whole or full-fat cream cheese. Whole-fat or sweetened yogurt. Full-fat cheese. Nondairy creamers. Whipped toppings. Processed cheese and cheese spreads.  Fats and oils  Butter. Stick margarine. Lard. Shortening. Ghee. Ortez fat. Tropical oils, such as coconut, palm kernel, or palm oil.  Seasonings and condiments  Onion salt, garlic salt, seasoned salt, table salt, and sea salt. Worcestershire sauce. Tartar sauce. Barbecue sauce. Teriyaki sauce. Soy sauce, including reduced-sodium. Steak sauce. Canned and packaged gravies. Fish sauce. Oyster sauce. Cocktail sauce. Store-bought horseradish. Ketchup. Mustard. Meat flavorings and tenderizers. Bouillon cubes. Hot sauces. Pre-made or packaged marinades. Pre-made or packaged taco seasonings. Relishes. Regular salad dressings.  Other foods  Salted popcorn and pretzels.  The items listed above may not be a complete list of foods and beverages you should avoid. Contact a dietitian for more information.  Where to find more information  · National Heart, Lung, and Blood [place]: www.nhlbi.nih.gov  · American Heart Association: www.heart.org  · Academy of Nutrition and Dietetics: www.eatright.org  · National Kidney Foundation: www.kidney.org  Summary  · The DASH eating plan is a healthy eating plan that has been shown to reduce high blood pressure (hypertension). It may also reduce your risk for type 2 diabetes, heart disease, and stroke.  · When on the DASH eating plan, aim to eat more fresh fruits and vegetables, whole grains, lean proteins, low-fat dairy, and heart-healthy fats.  · With the DASH eating plan, you should limit salt (sodium) intake to 2,300 mg a day. If you have hypertension, you may need to reduce your  sodium intake to 1,500 mg a day.  · Work with your health care provider or dietitian to adjust your eating plan to your individual calorie needs.  This information is not intended to replace advice given to you by your health care provider. Make sure you discuss any questions you have with your health care provider.  Document Revised: 11/20/2020 Document Reviewed: 11/20/2020  [employer] Patient Education © 2021 [employer]      Tobacco Use Disorder  Tobacco use disorder (TUD) occurs when a person craves, seeks, and uses tobacco, regardless of the consequences. This disorder can cause problems with mental and physical health. It can affect your ability to have healthy relationships, and it can keep you from meeting your responsibilities at work, home, or school.  Tobacco may be:  · Smoked as a cigarette or cigar.  · Inhaled using e-cigarettes.  · Smoked in a pipe or hookah.  · Chewed as smokeless tobacco.  · Inhaled into the nostrils as snuff.  Tobacco products contain a dangerous chemical called nicotine, which is very addictive. Nicotine triggers hormones that make the body feel stimulated and works on areas of the brain that make you feel good. These effects can make it hard for people to quit nicotine.  Tobacco contains many other unsafe chemicals that can damage almost every organ in the body. Smoking tobacco also puts others in danger due to fire risk and possible health problems caused by breathing in secondhand smoke.  What are the signs or symptoms?  Symptoms of TUD may include:  · Being unable to slow down or stop your tobacco use.  · Spending an abnormal amount of time getting or using tobacco.  · Craving tobacco. Cravings may last for up to 6 months after quitting.  · Tobacco use that:  ? Interferes with your work, school, or home life.  ? Interferes with your personal and social relationships.  ? Makes you give up activities that you once enjoyed or found important.  · Using tobacco even though you know  that it is:  ? Dangerous or bad for your health or someone else's health.  ? Causing problems in your life.  · Needing more and more of the substance to get the same effect (developing tolerance).  · Experiencing unpleasant symptoms if you do not use the substance (withdrawal). Withdrawal symptoms may include:  ? Depressed, anxious, or irritable mood.  ? Difficulty concentrating.  ? Increased appetite.  ? Restlessness or trouble sleeping.  · Using the substance to avoid withdrawal.  How is this diagnosed?  This condition may be diagnosed based on:  · Your current and past tobacco use. Your health care provider may ask questions about how your tobacco use affects your life.  · A physical exam.  You may be diagnosed with TUD if you have at least two symptoms within a 12-month period.  How is this treated?  This condition is treated by stopping tobacco use. Many people are unable to quit on their own and need help. Treatment may include:  · Nicotine replacement therapy (NRT). NRT provides nicotine without the other harmful chemicals in tobacco. NRT gradually lowers the dosage of nicotine in the body and reduces withdrawal symptoms. NRT is available as:  ? Over-the-counter gums, lozenges, and skin patches.  ? Prescription mouth inhalers and nasal sprays.  · Medicine that acts on the brain to reduce cravings and withdrawal symptoms.  · A type of talk therapy that examines your triggers for tobacco use, how to avoid them, and how to cope with cravings (behavioral therapy).  · Hypnosis. This may help with withdrawal symptoms.  · Joining a support group for others coping with TUD.  The best treatment for TUD is usually a combination of medicine, talk therapy, and support groups. Recovery can be a long process. Many people start using tobacco again after stopping (relapse). If you relapse, it does not mean that treatment will not work.  Follow these instructions at home:    Lifestyle  · Do not use any products that contain  nicotine or tobacco, such as cigarettes and e-cigarettes.  · Avoid things that trigger tobacco use as much as you can. Triggers include people and situations that usually cause you to use tobacco.  · Avoid drinks that contain caffeine, including coffee. These may worsen some withdrawal symptoms.  · Find ways to manage stress. Wanting to smoke may cause stress, and stress can make you want to smoke. Relaxation techniques such as deep breathing, meditation, and yoga may help.  · Attend support groups as needed. These groups are an important part of long-term recovery for many people.  General instructions  · Take over-the-counter and prescription medicines only as told by your health care provider.  · Check with your health care provider before taking any new prescription or over-the-counter medicines.  · Decide on a friend, family member, or smoking quit-line (such as 1-800-QUIT-NOW in the [place] that you can call or text when you feel the urge to smoke or when you need help coping with cravings.  · Keep all follow-up visits as told by your health care provider and therapist. This is important.  Contact a health care provider if:  · You are not able to take your medicines as prescribed.  · Your symptoms get worse, even with treatment.  Summary  · Tobacco use disorder (TUD) occurs when a person craves, seeks, and uses tobacco regardless of the consequences.  · This condition may be diagnosed based on your current and past tobacco use and a physical exam.  · Many people are unable to quit on their own and need help. Recovery can be a long process.  · The most effective treatment for TUD is usually a combination of medicine, talk therapy, and support groups.  This information is not intended to replace advice given to you by your health care provider. Make sure you discuss any questions you have with your health care provider.  Document Revised: 12/05/2018 Document Reviewed: 12/05/2018  [employer] Patient Education © 2021  [employer]

## 2021-06-03 NOTE — PROGRESS NOTES
"Primary Care Provider: Aleisha Garrett PA  Requesting Provider: Marcel Rojas MD    Chief Complaint:   Chief Complaint   Patient presents with   • Leg Pain     Complaints of numbness and tingling in rt leg only.   Pt has had lumbar laminectomy  in 2/17.     History of Present Illness  Consultation today at the request of Marcel Rojas MD    HPI:  Nasir Crouch is a 35 y.o. male who presents today with a complaint of lumbar back and right leg pain, 80% back, 20% right leg.  History of a right microdiscectomy at L4-5 by Dr. Shepherd out of [facility] in 2017 for lumbar back and right leg pain and weakness.  Postoperatively, his right leg pain resolved and he regained full right lower extremity strength.    Gradual and progressive onset of worsening lumbar back and right leg pain that began 2-3 months post microdiscectomy.  Due to his progressive discomfort, he was referred to [facility] orthopedics for pain management.    Mr. Crouch currently complains of constant lumbar back pain that waxes and wanes in severity, as well as an intermittent \"dull ache\" to the posterior thigh and numbness and tingling to the entire left foot that extends through the medial leg in ascending fashion to the knee.  He additionally reports an intermittent vibratory sensation to the right lower extremity.  His discomfort worsens with prolonged lying down, sitting, and standing.  Alleviating factors include rest, application of heat, TENS unit, and hydrocodone and tizanidine which he obtains through pain management.  He denies gait and balance dysfunction, however states he has had to alter his gait due to a right foot drop.  He additionally denies fevers, chills, night sweats, unexplained weight loss, saddle anesthesia, or bowel bladder dysfunction.  He currently rates the severity of his symptoms 6/10.  No additional concerns at this time.    Mr. Crouch has not recently completed nor participated in a dedicated course of " physician directed physical therapy, massage care, nor chiropractic care.    To note, Mr. Crouch surgery in 2017 was due to a work-related injury as a .  He has since had to resign said position, currently working as a .    Oswestry Disability Index = 48%   Score   Pain Intensity Moderate pain-2   Personal Care Look after myslef but very painful-1   Lifting Medium weights off a table-3   Walking Pain prevents > 100 yards-3   Sitting Pain prevents sitting > 1 hr-2   Standing Pain limits standing to < 1/2 hr-3   Sleeping Can only sleep < 4 hrs-3   Sex Life (if applicable) Sex causes extra pain-2   Social Life Pain restricts me to my home-4   Traveling Pain is bad but trips over 2 hrs-2   (Duane et al, 1980)    SCORE INTERPRETATION OF THE OSWESTRY LBP DISABILITY QUESTIONNAIRE     40-60% Severe disability Pain remains the main problem in this group of patients, but travel, personal care, social life, sexual activity, and sleep are also affected.  These patients require detailed investigation.     ROS  Review of Systems   HENT: Negative.    Eyes: Negative.    Respiratory: Negative.    Cardiovascular: Negative.    Gastrointestinal: Negative.    Endocrine: Negative.    Genitourinary: Negative.    Musculoskeletal: Positive for back pain.   Skin: Negative.    Allergic/Immunologic: Negative.    Neurological: Positive for weakness and numbness.   Hematological: Negative.    Psychiatric/Behavioral: Negative.    All other systems reviewed and are negative.    Past Medical History:   Diagnosis Date   • TIA (transient ischemic attack) 2011     Past Surgical History:   Procedure Laterality Date   • KNEE CARTILAGE SURGERY       Family History: family history is not on file.    Social History:  reports that he has never smoked. His smokeless tobacco use includes chew. He reports current alcohol use. He reports that he does not use drugs.    Medications:    Current Outpatient Medications:   •  diazePAM  "(VALIUM) 5 MG tablet, Take 1 tablet by mouth Every 6 (Six) Hours As Needed for Anxiety., Disp: 8 tablet, Rfl: 0  •  anastrozole (ARIMIDEX) 1 MG tablet, anastrozole 1 mg tablet  1/2 tablet by mouth once per week., Disp: , Rfl:   •  Cariprazine HCl (Vraylar) 1.5 MG capsule capsule, Vraylar 1.5 mg capsule, Disp: , Rfl:   •  hydroCHLOROthiazide (MICROZIDE) 12.5 MG capsule, hydrochlorothiazide 12.5 mg capsule, Disp: , Rfl:   •  HYDROcodone-acetaminophen (NORCO) 5-325 MG per tablet, hydrocodone 5 mg-acetaminophen 325 mg tablet, Disp: , Rfl:   •  lisinopril (PRINIVIL,ZESTRIL) 40 MG tablet, lisinopril 40 mg tablet, Disp: , Rfl:   •  pregabalin (LYRICA) 150 MG capsule, , Disp: , Rfl:   •  QUEtiapine (SEROquel) 25 MG tablet, quetiapine 25 mg tablet, Disp: , Rfl:   •  sildenafil (VIAGRA) 100 MG tablet, sildenafil 100 mg tablet, Disp: , Rfl:   •  Testosterone Cypionate (DEPOTESTOTERONE CYPIONATE) 200 MG/ML injection, 0.75 mL., Disp: , Rfl:   •  tiZANidine (ZANAFLEX) 4 MG tablet, tizanidine 4 mg tablet, Disp: , Rfl:     Allergies:  Patient has no known allergies.    Objective   Ht 172.7 cm (68\") Comment: pt reports  Wt 124 kg (274 lb)   BMI 41.66 kg/m²   Physical Exam  Vitals and nursing note reviewed.   Constitutional:       General: He is not in acute distress.     Appearance: Normal appearance. He is well-developed and well-groomed. He is morbidly obese. He is not ill-appearing, toxic-appearing or diaphoretic.      Comments: BMI 41.66   HENT:      Head: Normocephalic and atraumatic.      Right Ear: Hearing normal.      Left Ear: Hearing normal.   Eyes:      Extraocular Movements: EOM normal.      Conjunctiva/sclera: Conjunctivae normal.      Pupils: Pupils are equal, round, and reactive to light.   Neck:      Trachea: Trachea normal.   Cardiovascular:      Rate and Rhythm: Normal rate and regular rhythm.   Pulmonary:      Effort: Pulmonary effort is normal. No tachypnea, bradypnea, accessory muscle usage or respiratory " distress.   Abdominal:      Palpations: Abdomen is soft.   Musculoskeletal:      Cervical back: Full passive range of motion without pain and neck supple.   Skin:     General: Skin is warm and dry.   Neurological:      Mental Status: He is alert and oriented to person, place, and time.      GCS: GCS eye subscore is 4. GCS verbal subscore is 5. GCS motor subscore is 6.      Deep Tendon Reflexes:      Reflex Scores:       Tricep reflexes are 2+ on the right side and 2+ on the left side.       Bicep reflexes are 2+ on the right side and 2+ on the left side.       Brachioradialis reflexes are 2+ on the right side and 2+ on the left side.       Patellar reflexes are 2+ on the right side and 2+ on the left side.       Achilles reflexes are 2+ on the right side and 2+ on the left side.  Psychiatric:         Speech: Speech normal.         Behavior: Behavior normal. Behavior is cooperative.       Neurologic Exam     Mental Status   Oriented to person, place, and time.   Attention: normal. Concentration: normal.   Speech: speech is normal   Level of consciousness: alert    Cranial Nerves     CN II   Visual fields full to confrontation.     CN III, IV, VI   Pupils are equal, round, and reactive to light.  Extraocular motions are normal.     CN V   Facial sensation intact.     CN VII   Facial expression full, symmetric.     CN VIII   CN VIII normal.     CN IX, X   CN IX normal.     CN XI   CN XI normal.     Motor Exam   Right arm tone: normal  Left arm tone: normal  Right leg tone: normal  Left leg tone: normal    Strength   Right deltoid: 5/5  Left deltoid: 5/5  Right biceps: 5/5  Left biceps: 5/5  Right triceps: 5/5  Left triceps: 5/5  Right wrist extension: 5/5  Left wrist extension: 5/5  Right iliopsoas: 5/5  Left iliopsoas: 5/5  Right quadriceps: 5/5  Left quadriceps: 5/5  Right anterior tibial: 4/5  Left anterior tibial: 5/5  Right gastroc: 5/5  Left gastroc: 5/5  Right EHL -4/5  Left EHL 5/5       Sensory Exam   Right  arm light touch: normal  Left arm light touch: normal  Right leg light touch: decreased from knee  Left leg light touch: normal    Gait, Coordination, and Reflexes     Tremor   Resting tremor: absent  Intention tremor: absent  Action tremor: absent    Reflexes   Right brachioradialis: 2+  Left brachioradialis: 2+  Right biceps: 2+  Left biceps: 2+  Right triceps: 2+  Left triceps: 2+  Right patellar: 2+  Left patellar: 2+  Right achilles: 2+  Left achilles: 2+  Right : 4+  Left : 4+  Right plantar: normal  Left plantar: normal  Right Stovall: absent  Left Stovall: absent  Right ankle clonus: absent  Left ankle clonus: absent  Right pendular knee jerk: absent  Left pendular knee jerk: absent  High-stepping gait on the right     Imaging: (independent review and interpretation)  No recent imaging     ASSESSMENT and PLAN  Nasir Crouch is a 35 y.o. male with a significant medical history of a right microdiscectomy in 2017 by Dr. Shepherd out of [facility], TIA 2011, hypertension, hypogonadism, tobacco abuse, and morbid obesity.  He presents with a new problem of lumbar back pain, right lower extremity weakness, and right pain in varying dermatomal distributions, 80% back, 20% right leg. GENO: 48.  Physical exam findings of -4/5 right anterior tibial and EHL weakness, normal reflexes, and a high-stepping right gait.  No recent imaging.    TREATMENT RECOMMENDATIONS ...  Lumbar back pain  Right leg pain in varying dermatomal distributions  Right anterior tibial and EHL weakness  Differential diagnosis include, but are not limited to degenerative lumbar stenosis, Lumbar stenosis with right lower extremity radiculopathy, herniated lumbar intervertebral disc or malingering, conversion disorder, CRPS, and secondary gain are diagnoses of exclusion.     We will proceed today by obtaining x-rays of the lumbar spine complete with flexion and extension.  As a means of first-line conservative management for Lumbar  pain, we will send Nasir for a dedicated course of physician directed physical therapy; Rx provided.  I additionally recommended chiropractic and/or massage care as tolerated.  Unless contraindicated, Tylenol and ibuprofen or naproxen PRN per package instructions for pain, or may continue current pain medications as previously prescribed by outside clinician.  B/R/AE and use discussed.  Once all testing is complete we will have Mr. Crouch follow-up with Dr. Cortés for reassessment and to discuss the need for surgical intervention.  I advised the patient to call to return sooner for new or worsening complaints of weakness, paresthesias, gait disturbances, or any additional concerns.  Treatment options discussed in detail with Nasir and he voiced understanding.  Mr. Crouch agrees with this plan of care.    Tobacco abuse  The patient understands the many dangers of continuing to use tobacco. Despite this, Mr. Crouch states quitting is not an immediate priority at this time and declines to discuss tobacco cessation.  I reminded the patient that if quitting becomes an increased priority to contact us for help with quitting.       Obese Class III extreme obesity: > or equal to 40kg/m2  Body mass index is 41.66 kg/m².  Information on the DASH diet provided in the AVS.  We will continue to provided diet and exercise information with the goal of weight loss at each scheduled appointment.     Diagnoses and all orders for this visit:    1. Lumbar back pain (Primary)  -     XR Spine Lumbar Complete With Flex & Ext; Future  -     Ambulatory Referral to Physical Therapy Evaluate and treat (2 to 3 times weekly for 6 weeks)    2. Weakness of right lower extremity    3. Pain of right lower extremity  -     XR Spine Lumbar Complete With Flex & Ext; Future  -     Ambulatory Referral to Physical Therapy Evaluate and treat (2 to 3 times weekly for 6 weeks)    4. Numbness and tingling of right lower extremity  -     XR Spine  Lumbar Complete With Flex & Ext; Future  -     Ambulatory Referral to Physical Therapy Evaluate and treat (2 to 3 times weekly for 6 weeks)    5. Chews tobacco    6. Class 3 severe obesity due to excess calories with body mass index (BMI) of 40.0 to 44.9 in adult, unspecified whether serious comorbidity present (CMS/HCC)      Return in about 6 weeks (around 7/15/2021) for FOLLOW WITH WITH NEIL AFTER COMPLETION OF PT IN 6 TO 8 WEEKS..    Thank you for this Consultation and the opportunity to participate in [facility].    Sincerely,  Neil Urias, APRN    Level of Risk: Moderate due to: undiagnosed new problem  MDM: Moderate  (Mod = 98271, High = 30073)

## 2023-02-25 NOTE — OP NOTE
SP eyefactive [facility]                               OPERATIVE REPORT    PATIENT NAME: Sharmaine Choi                :         1985  MED REC NO:   [id number]                              ROOM:  ACCOUNT NO:   [de-identified]                           ADMIT DATE:  10/25/2018  PROVIDER:     David Shafer MD      DATE OF PROCEDURE:  10/26/2018    PREOPERATIVE DIAGNOSES:  1. Acute appendicitis. 2.  Umbilical hernia repair. POSTOPERATIVE DIAGNOSES:  1. Acute appendicitis with gangrenous appendicitis. 2.  Umbilical hernia repair. PROCEDURE:  Laparoscopic appendectomy. SURGEON:  David Shafer MD    ASSISTANT:  Brooklyn Jovel PA-C. He was present for all portions of  the case including all critical portions as well as closure. ANESTHESIA:  General.    INDICATIONS:  The patient is a 65-year-old gentleman who presented  with about a 36-hour history of mid abdominal pain, was localized to  the right lower quadrant. He had guarding and rebound. He was  initially seen at [facility], where CT scan showed acute  appendicitis. He was transferred here late last night for anticipated  surgery this morning. We discussed risks and benefits of laparoscopic  appendectomy with the patient, he understands, is agreeable and was  eager to proceed. OPERATIVE PROCEDURE:  Today, he was brought to the operating room,  underwent adequate general anesthesia, and prepped and draped in  sterile fashion. A curvilinear incision was made in the inferior  aspect of the umbilicus, dissected down, encountered a small umbilical  hernia which we opened and excised the sac. We then entered the  abdomen and bluntly inserted the blunt port, insufflated the abdomen  and placed other two trocars under direct vision. We began looking in  the right lower quadrant, appendix was gangrenous but intact.   We  freed it up and divided the mesoappendix using the Endo ANDREA. We  continued our resection and additional Endo ANDREA firings across the  mesoappendix, getting it cleared up. Once this was accomplished, we  fired the Endo ANDREA again across the base of the appendix without  difficulty and brought the appendix out through the umbilicus using  the EndoCatch. We then irrigated copiously, made sure we had good  hemostasis. Everything appeared to be in good order. We evacuated  our irrigant and closed the fascial defect of the hernia with a 0  Vicryl suture, the subcu with 3-0 Vicryl and the skin with 4-0  Monocryl. Sterile dressings were applied. Estimated blood loss,  minimal.  Complications, none. He tolerated the procedure well.         Shira Alaniz MD    D: 10/26/2018 14:01:34      T: 10/26/2018 14:33:16     [email or web address]  Job#: [id number]     Doc#: [id number]    CC: Patient transferred from [facility], diego/w PA in ER.  Patient admitted under other MD, HARRIS and P done, reviewed, will transfer under my service from today.  Detail d/w daughter as she called me yesterday. Patient transferred from [facility], [place]/w PA in ER.  Patient admitted under other MD, HARRIS and P done, reviewed, will transfer under my service from today.  Detail d/w daughter as she called me yesterday. Patient transferred from [facility], [place]/w PA in ER.  Patient admitted under other MD, HARRIS and P done, reviewed, will transfer under my service from today.  Detail d/w daughter as she called me yesterday.

## 2024-03-13 ENCOUNTER — TRANSCRIBE ORDERS (OUTPATIENT)
Dept: ADMINISTRATIVE | Facility: HOSPITAL | Age: 39
End: 2024-03-13
Payer: COMMERCIAL

## 2024-03-13 ENCOUNTER — HOSPITAL ENCOUNTER (OUTPATIENT)
Dept: GENERAL RADIOLOGY | Facility: HOSPITAL | Age: 39
Discharge: HOME | End: 2024-03-13
Admitting: PAIN MEDICINE
Payer: COMMERCIAL

## 2024-03-13 DIAGNOSIS — M47.816 LUMBAR SPONDYLOSIS: Primary | ICD-10-CM

## 2024-03-13 DIAGNOSIS — M47.816 LUMBAR SPONDYLOSIS: ICD-10-CM

## 2024-03-13 PROCEDURE — 72110 X-RAY EXAM L-2 SPINE 4/>VWS: CPT

## 2024-08-27 ENCOUNTER — TRANSCRIBE ORDERS (OUTPATIENT)
Dept: ADMINISTRATIVE | Facility: HOSPITAL | Age: 39
End: 2024-08-27
Payer: COMMERCIAL

## 2024-08-27 DIAGNOSIS — M47.816 LUMBAR SPONDYLOSIS: Primary | ICD-10-CM

## 2024-10-12 ENCOUNTER — HOSPITAL ENCOUNTER (OUTPATIENT)
Dept: MRI IMAGING | Facility: HOSPITAL | Age: 39
Discharge: HOME | End: 2024-10-12
Payer: COMMERCIAL

## 2024-10-12 DIAGNOSIS — M47.816 LUMBAR SPONDYLOSIS: ICD-10-CM

## 2024-10-12 PROCEDURE — 72148 MRI LUMBAR SPINE W/O DYE: CPT

## (undated) DEVICE — GLOVE SURG SZ 75 CRM LTX FREE POLYISOPRENE POLYMER BEAD ANTI

## (undated) DEVICE — TROCAR ENDOSCP L100MM DIA5MM BLDELSS STBL SL OBT RADLUC

## (undated) DEVICE — ASTOUND STANDARD SURGICAL GOWN, XXL: Brand: CONVERTORS

## (undated) DEVICE — SOLUTION IV 1000ML LAC RINGERS PH 6.5 INJ USP VIAFLX PLAS

## (undated) DEVICE — GLOVE SURG SZ 85 L12IN FNGR ORTHO 126MIL CRM LTX FREE

## (undated) DEVICE — SUTURE SZ 0 27IN 5/8 CIR UR-6  TAPER PT VIOLET ABSRB VICRYL J603H

## (undated) DEVICE — BAG SPEC REM 224ML W4XL6IN DIA10MM 1 HND GYN DISP ENDOPCH

## (undated) DEVICE — RELOAD STPL H1-2.5X45MM VASC THN TISS WHT 6 ROW B FRM SGL

## (undated) DEVICE — GOWN,PREVENTION PLUS,XL,ST,24/CS: Brand: MEDLINE

## (undated) DEVICE — SUTURE PERMAHAND SZ 2-0 L18IN NONABSORBABLE BLK L26MM FS 685G

## (undated) DEVICE — SUTURE MCRYL SZ 4-0 L18IN ABSRB UD L19MM PS-2 3/8 CIR PRIM Y496G

## (undated) DEVICE — CUTTER ENDOSCP L340MM LIN ARTC SGL STROKE FIRING ENDOPATH

## (undated) DEVICE — SOLUTION IV 1000ML 0.9% SOD CHL PH 5 INJ USP VIAFLX PLAS

## (undated) DEVICE — RELOAD STPL SZ 0 L45MM DIA3.5MM 0DEG STD REG TISS BLU TI

## (undated) DEVICE — PUMP SUC IRR TBNG L10FT W/ HNDPC ASSEMB STRYKEFLOW 2

## (undated) DEVICE — SOLUTION IV IRRIG POUR BRL 0.9% SODIUM CHL 2F7124

## (undated) DEVICE — GENERAL LAP CDS

## (undated) DEVICE — SUTURE VCRL SZ 3-0 L27IN ABSRB UD L26MM SH 1/2 CIR J416H

## (undated) DEVICE — GOWN,PREVENTION PLUS,2XL,ST,22/CS: Brand: MEDLINE

## (undated) DEVICE — TROCAR ENDOSCP L100MM DIA12MM BLNT STBL SL DISP ENDOPATH